# Patient Record
Sex: FEMALE | Race: WHITE | Employment: FULL TIME | URBAN - METROPOLITAN AREA
[De-identification: names, ages, dates, MRNs, and addresses within clinical notes are randomized per-mention and may not be internally consistent; named-entity substitution may affect disease eponyms.]

---

## 2017-09-07 NOTE — H&P
Vital Signs   24Years Old Female  Height:  62.5 inches  Weight: 140 pounds  BMI:      25.29  BSA:      1.65  BP:       92/54    Past Pregnancy History   : 0  Para:     0  Aborta:  0  Term: 0, Premature: 0, Living Children: 0, Vaginal Deliveries: 0, C-Sections: 0, Elect. Ab: 0, Ectopics: 0    Gynecologic History   Last Menstrual Period: 2017  Additional Menses Information: irregular  Does patient have any problems with urine leakage? no  Does patient have any other bladder problems? yes  History of abnormal pap: no  Gardasil Injection History: Complete  Pt currently sexually active: no  Pt ever sexually active: yes  Current Contraception: Oral Contraception. History of STD: yes   STD Type: Herpes. Visit Type:  Problem GYN  Primary Provider:  Jackie Ferris MD    CC:  dyspareunia. History of Present Illness:  22yo Patient presents today complaining of pain during intercourse and recurrent UTIs treated at [facility], not taking OCPs x2mo. Endometriosis diagnosed at laparoscopy in . Then did lupron and PT and has improved dramatically. Was on OCPs until a few months ago, when her pain gradually began to increase. Also has some \"growths\" on her perineum which are more painful with sex and she would like them removed as well. Currently taking keflex for UTI. Allergies    This patient has no known allergies. Medications Removed from Medication List          Past Medical History:     Reviewed history from 2014 and no changes required:        acne        h/o of MRSA        Endometriosis        sepsis 10/13-hospitalized    Past Surgical History:     Reviewed history from 11/15/2016 and no changes required:        Colonoscopy at age 2 c/o rectal bleeding        dx laparoscopy, excision of endometriosis.  13 Mitali    Family History Summary:      Reviewed history Last on 2017 and no changes required:2017  Mother Dannie Kayser.) - Has Family History of Breast Cancer - Entered On: 2015    General Comments - FH:  Family history transferred to 2 compliant       Social History:     Reviewed history from 2017 and no changes required:        Single                Graduated [employer], apply for grad [employer] for social work        has 1 older sister, 2 younger sisters        lives with 2 of her sisters        dad in insurance                Smoking History:        Patient has never smoked. Risk Factors:     Smoked Tobacco Use:  Never smoker  Smokeless Tobacco Use:  Never  Passive smoke exposure:  no  Drug use:  no  HIV high-risk behavior:  no  Caffeine use:  occas. drinks per day  Alcohol use:  yes     Type:  rare  Exercise:  yes     Times per week:  3-4     Type of Exercise:  gym  Seatbelt use:  100 %  Sun Exposure:  occasionally    Family History Risk Factors:     Family History of MI in females < 72years old:  no     Family History of MI in males < 54years old:  no    Previous Tobacco Use: Signed On - 2017  Smoked Tobacco Use:  Never smoker  Smokeless Tobacco Use:  Never  Passive smoke exposure:  no  Drug use:  no  HIV high-risk behavior:  no  Caffeine use:  occas. drinks per day    Previous Alcohol Use: Signed On - 2017  Alcohol use:  yes     Type:  rare  Exercise:  yes     Times per week:  3-4     Type of Exercise:  gym  Seatbelt use:  100 %  Sun Exposure:  occasionally    Family History Risk Factors:     Family History of MI in females < 72years old:  no     Family History of MI in males < 54years old:  no    PAP Smear History:     Date of Last PAP Smear:  10/19/2016      Past Pregnancy History      :  0     Term Births:  0     Premature Births: 0     Living Children: 0     Para:   0     Prev : 0     Aborta:  0     Elect. Ab:  0     Spont.  Ab:  0     Ectopics:  0      Review of Systems        See HPI      General Medical Physical Exam:     General Appearance:       well developed, well nourished, in no acute distress    Head: Inspection:  normocephalic without obvious abnormalities    Eyes:        External:  EOM intact    Ears, Nose, Throat:        External:  normocephalic and atraumatic       Hearing:  grossly intact    Neck:        Neck:  supple; no masses; trachea midline       Thyroid:  no nodules, masses, tenderness, or enlargement    Respiratory:        Resp. effort:  no use of accessory muscles       Auscultation:   no rales, rhonchi, or wheezes    Cardiovascular: Auscultation:   normal S1 and  S2; no murmur, rub, or gallop       Peripheral circ: no cyanosis, clubbing, or edema    Gastrointestinal:        Abdomen:  soft and non-tender with normal bowel sounds; no masses       Liver/spleen:   normal to percussion; no enlargement or nodularity       Hernia:  no hernias    Genitourinary:        Ext. genitalia: three discreete skin tags at perineum and a moderate hemorrhoid which is nontender noted. Urethra:  No palpable abnormalities. Mild hypermobility noted. Bladder:  no cystocele       Vagina:  normal appearing without lesions or discharge       Cervix:  normal appearance; no lesions or discharge       Uterus:  normal size and position; no masses       Adnexa:  diffusely tender on pelvic exam, marked tenderness and nodularity cul de sac and left side    Musculoskeletal:        Gait/station:  normal gait    Lymphatic:        Axilla:  no axillary adenopathy       Inguinal:  <1cm, mobile lymph nodes    Skin:        Inspection:  no rashes, suspicious lesions, or ulcerations    Neurological:        Other:  grossly intact    Psychiatric:       Orientation:  oriented to time, place, and person            Impression & Recommendations:    Problem # 1:  Endometriosis (ICD-617.0) (OIZ51-J31.0)  Now with marked recurrent cul de sac symptoms. Discussed repeating lupron vs. proceeding with another laparoscopy and she prefers the latter. Will plan scope and excision of endometriosis.    I reviewed with the patient indications, alternatives, risks, and benefits of proposed procedure, the risks of which include injury to bowel, bladder, nerves, blood vessels, or ureters, injury to any other intraabdominal structure, risk of bleeding and infection, and inherent risks of anesthesia, including death. The patient indicates understanding of these risks, and agrees to the proposed procedure. She is instructed to stay NPO after midnight the night before surgery. Orders:  Detailed Moderate Est level 4 ([id number])      Problem # 2:  Skin tag of the vulva and perineum (ICD-624.8) (GNB82-M34.89)  Would like these removed as well. Discussed removal of skin tag but NOT the hemorrhoid which is also present and she understands and agrees. Her hemorrhoid is asymptomatic. Orders:  Detailed Moderate Est level 4 ([id number])      Medications (at conclusion of this visit)    08/22/2017 LIDOCAINE HCL 2 % GEL (LIDOCAINE HCL) apply to AA as needed for discomfort  05/03/2016 LO LOESTRIN FE 1 MG-10 MCG / 10 MCG TABS (NORETHIN-ETH ESTRAD-FE BIPHAS) one daily. 04/14/2016 TRAMADOL HCL 50 MG TABS (TRAMADOL HCL) one po every 6 hours prn pain  01/07/2016 URIBEL 118 MG CAPS (METH-HYO-M BL-NA PHOS-PH SAL) Take 1 po q 6 hours prn burning, frequency, pain. Urine will turn blue  10/29/2015 MULTIVITAMINS ORAL CAPS (MULTIPLE VITAMIN) Prescribed by khoi Hernandez/Tana Love MD  10/29/2015 BUSPIRONE HCL TABS (BUSPIRONE HCL TABS) Prescribed by non 606/Tana Love MD  04/21/2014 VALACYCLOVIR HCL 1 GM TABS (VALACYCLOVIR HCL) one a day  10/18/2011 LEXAPRO TABS (ESCITALOPRAM OXALATE TABS) Prescribed by non 606/Tana Love MD          Electronically signed by Jerel Triana MD on 09/07/2017 at 11:03 AM    ________________________________________________________________________    The History and Physical is reviewed today. The patient is seen and examined and no changes are required.   Jerel Triana MD  9/11/2017  10:26 AM

## 2017-09-08 NOTE — PERIOP NOTES
[facility]  Preoperative Instructions        Surgery Date 9/11/17           Time of Arrival 8:30am    1. On the day of your surgery, please report to the Surgical Services Registration Desk and sign in at your designated time. The Surgery Center is located to the right of the Emergency Room. 2. You must have someone with you to drive you home. You should not drive a car for 24 hours following surgery. Please make arrangements for a friend or family member to stay with you for the first 24 hours after your surgery. 3. Do not have anything to eat or drink (including water, gum, mints, coffee, juice) after midnight 9/10/17?? Imani Belch ? This may not apply to medications prescribed by your physician. ?(Please note below the special instructions with medications to take the morning of your procedure.)    4. We recommend you do not drink any alcoholic beverages for 24 hours before and after your surgery. 5. Stop all Aspirin, non-steroidal anti-inflammatory drugs (i.e. Advil, Aleve), vitamins, and supplements?as directed by your surgeon's office. ? **If you are currently taking Plavix, Coumadin, or other blood-thinning agents, contact your surgeon for instructions. **    6. Wear comfortable clothes. Wear glasses instead of contacts. Do not bring any money or jewelry. Please bring picture ID, insurance card, and any prearranged co-payment or hospital payment. Do not wear make-up, particularly mascara the morning of your surgery. Do not wear nail polish, particularly if you are having foot /hand surgery. Wear your hair loose or down, no ponytails, buns, mahsa pins or clips. All body piercings must be removed. Please shower with antibacterial soap for three consecutive days before and on the morning of surgery, but do not apply any lotions, powders or deodorants after the shower on the day of surgery. Please use a fresh towels after each shower.  Please sleep in clean clothes and change bed linens the night before surgery. Please do not shave for 48 hours prior to surgery. Shaving of the face is acceptable. 7. You should understand that if you do not follow these instructions your surgery may be cancelled. If your physical condition changes (I.e. fever, cold or flu) please contact your surgeon as soon as possible. 8. It is important that you be on time. If a situation occurs where you may be late, please call [phone] (OR Holding Area). 9. If you have any questions and or problems, please call [phone] (Pre-admission Testing). 10. Your surgery time may be subject to change. You will receive a phone call the evening prior if your time changes. 11.  If having outpatient surgery, you must have someone to drive you here, stay with you during the duration of your stay, and to drive you home at time of discharge. 12.   In an effort to improve the efficiency, privacy, and safety for all of our Pre-op patients visitors are not allowed in the Holding area. Once you arrive and are registered your family/visitors will be asked to remain in the waiting room. The Pre-op staff will get you from the Surgical Waiting Area and will explain to you and your family/visitors that the Pre-op phase is beginning. The staff will answer any questions and provide instructions for tracking of the patient, by use of the existing tracking number and color-coded status board in the waiting room. At this time the staff will also ask for your designated spokesperson information in the event that the physician or staff need to provide an update or obtain any pertinent information. The designated spokesperson will be notified if the physician needs to speak to family during the pre-operative phase. If at any time your family/visitors has questions or concerns they may approach the volunteer desk in the waiting area for assistance.          Special Instructions:    MEDICATIONS TO TAKE THE MORNING OF SURGERY WITH A SIP OF WATER:Buspar, Lexapro      I understand a pre-operative phone call will be made to verify my surgery time. In the event that I am not available, I give permission for a message to be left on my answering service and/or with another person?   Yes          ___________________      __________   _________    (Signature of Patient)             (Witness)                (Date and Time)

## 2017-09-11 ENCOUNTER — HOSPITAL ENCOUNTER (OUTPATIENT)
Age: 22
Setting detail: OUTPATIENT SURGERY
Discharge: HOME | End: 2017-09-11
Attending: OBSTETRICS & GYNECOLOGY | Admitting: OBSTETRICS & GYNECOLOGY
Payer: COMMERCIAL

## 2017-09-11 ENCOUNTER — ANESTHESIA (OUTPATIENT)
Dept: SURGERY | Age: 22
End: 2017-09-11
Payer: COMMERCIAL

## 2017-09-11 ENCOUNTER — ANESTHESIA EVENT (OUTPATIENT)
Dept: SURGERY | Age: 22
End: 2017-09-11
Payer: COMMERCIAL

## 2017-09-11 VITALS
OXYGEN SATURATION: 95 % | RESPIRATION RATE: 14 BRPM | BODY MASS INDEX: 24.69 KG/M2 | HEART RATE: 66 BPM | HEIGHT: 63 IN | DIASTOLIC BLOOD PRESSURE: 56 MMHG | SYSTOLIC BLOOD PRESSURE: 105 MMHG | WEIGHT: 139.33 LBS | TEMPERATURE: 98.9 F

## 2017-09-11 LAB — HCG UR QL: NEGATIVE

## 2017-09-11 PROCEDURE — 77030008684 HC TU ET CUF COVD -B: Performed by: ANESTHESIOLOGY

## 2017-09-11 PROCEDURE — 76210000006 HC OR PH I REC 0.5 TO 1 HR: Performed by: OBSTETRICS & GYNECOLOGY

## 2017-09-11 PROCEDURE — 74011250636 HC RX REV CODE- 250/636: Performed by: ANESTHESIOLOGY

## 2017-09-11 PROCEDURE — C1765 ADHESION BARRIER: HCPCS | Performed by: OBSTETRICS & GYNECOLOGY

## 2017-09-11 PROCEDURE — 77030026438 HC STYL ET INTUB CARD -A: Performed by: ANESTHESIOLOGY

## 2017-09-11 PROCEDURE — 74011000250 HC RX REV CODE- 250: Performed by: OBSTETRICS & GYNECOLOGY

## 2017-09-11 PROCEDURE — 76210000020 HC REC RM PH II FIRST 0.5 HR: Performed by: OBSTETRICS & GYNECOLOGY

## 2017-09-11 PROCEDURE — 77030008771 HC TU NG SALEM SUMP -A: Performed by: ANESTHESIOLOGY

## 2017-09-11 PROCEDURE — 77030002933 HC SUT MCRYL J&J -A: Performed by: OBSTETRICS & GYNECOLOGY

## 2017-09-11 PROCEDURE — 77030018684: Performed by: OBSTETRICS & GYNECOLOGY

## 2017-09-11 PROCEDURE — 81025 URINE PREGNANCY TEST: CPT

## 2017-09-11 PROCEDURE — 77030019908 HC STETH ESOPH SIMS -A: Performed by: ANESTHESIOLOGY

## 2017-09-11 PROCEDURE — 74011250636 HC RX REV CODE- 250/636

## 2017-09-11 PROCEDURE — 74011250637 HC RX REV CODE- 250/637

## 2017-09-11 PROCEDURE — 77030020263 HC SOL INJ SOD CL0.9% LFCR 1000ML: Performed by: OBSTETRICS & GYNECOLOGY

## 2017-09-11 PROCEDURE — 77030034850: Performed by: OBSTETRICS & GYNECOLOGY

## 2017-09-11 PROCEDURE — 76010000153 HC OR TIME 1.5 TO 2 HR: Performed by: OBSTETRICS & GYNECOLOGY

## 2017-09-11 PROCEDURE — 74011000250 HC RX REV CODE- 250

## 2017-09-11 PROCEDURE — 77030020782 HC GWN BAIR PAWS FLX 3M -B

## 2017-09-11 PROCEDURE — 77030011640 HC PAD GRND REM COVD -A: Performed by: OBSTETRICS & GYNECOLOGY

## 2017-09-11 PROCEDURE — 77030020702 HC ADAPT HARM DISP J&J -B: Performed by: OBSTETRICS & GYNECOLOGY

## 2017-09-11 PROCEDURE — 88305 TISSUE EXAM BY PATHOLOGIST: CPT | Performed by: OBSTETRICS & GYNECOLOGY

## 2017-09-11 PROCEDURE — 77030016151 HC PROTCTR LNS DFOG COVD -B: Performed by: OBSTETRICS & GYNECOLOGY

## 2017-09-11 PROCEDURE — 77030018836 HC SOL IRR NACL ICUM -A: Performed by: OBSTETRICS & GYNECOLOGY

## 2017-09-11 PROCEDURE — 88304 TISSUE EXAM BY PATHOLOGIST: CPT | Performed by: OBSTETRICS & GYNECOLOGY

## 2017-09-11 PROCEDURE — 77030033067 HC SUT PDO STRATFX SPIR J&J -B: Performed by: OBSTETRICS & GYNECOLOGY

## 2017-09-11 PROCEDURE — 77030010507 HC ADH SKN DERMBND J&J -B: Performed by: OBSTETRICS & GYNECOLOGY

## 2017-09-11 PROCEDURE — 77030008756 HC TU IRR SUC STRY -B: Performed by: OBSTETRICS & GYNECOLOGY

## 2017-09-11 PROCEDURE — 77030032490 HC SLV COMPR SCD KNE COVD -B: Performed by: OBSTETRICS & GYNECOLOGY

## 2017-09-11 PROCEDURE — 77030010032 HC SCLPL DISECT HARM J&J -C: Performed by: OBSTETRICS & GYNECOLOGY

## 2017-09-11 PROCEDURE — 77030018778 HC MANIP UTER VCAR CNMD -B: Performed by: OBSTETRICS & GYNECOLOGY

## 2017-09-11 PROCEDURE — 77030008517 HC TBNG INSUF ENDO STOR -B: Performed by: OBSTETRICS & GYNECOLOGY

## 2017-09-11 PROCEDURE — 77030008606 HC TRCR ENDOSC KII AMR -B: Performed by: OBSTETRICS & GYNECOLOGY

## 2017-09-11 PROCEDURE — 77030034154 HC SHR COAG HARM ACE J&J -F: Performed by: OBSTETRICS & GYNECOLOGY

## 2017-09-11 PROCEDURE — 76060000034 HC ANESTHESIA 1.5 TO 2 HR: Performed by: OBSTETRICS & GYNECOLOGY

## 2017-09-11 PROCEDURE — 77030002888 HC SUT CHRMC J&J -A: Performed by: OBSTETRICS & GYNECOLOGY

## 2017-09-11 RX ORDER — FENTANYL CITRATE 50 UG/ML
50 INJECTION, SOLUTION INTRAMUSCULAR; INTRAVENOUS AS NEEDED
Status: DISCONTINUED | OUTPATIENT
Start: 2017-09-11 | End: 2017-09-11 | Stop reason: HOSPADM

## 2017-09-11 RX ORDER — SODIUM CHLORIDE, SODIUM LACTATE, POTASSIUM CHLORIDE, CALCIUM CHLORIDE 600; 310; 30; 20 MG/100ML; MG/100ML; MG/100ML; MG/100ML
100 INJECTION, SOLUTION INTRAVENOUS CONTINUOUS
Status: DISCONTINUED | OUTPATIENT
Start: 2017-09-11 | End: 2017-09-11 | Stop reason: HOSPADM

## 2017-09-11 RX ORDER — SODIUM CHLORIDE, SODIUM LACTATE, POTASSIUM CHLORIDE, CALCIUM CHLORIDE 600; 310; 30; 20 MG/100ML; MG/100ML; MG/100ML; MG/100ML
25 INJECTION, SOLUTION INTRAVENOUS CONTINUOUS
Status: DISCONTINUED | OUTPATIENT
Start: 2017-09-11 | End: 2017-09-11 | Stop reason: HOSPADM

## 2017-09-11 RX ORDER — FENTANYL CITRATE 50 UG/ML
25 INJECTION, SOLUTION INTRAMUSCULAR; INTRAVENOUS
Status: DISCONTINUED | OUTPATIENT
Start: 2017-09-11 | End: 2017-09-11 | Stop reason: HOSPADM

## 2017-09-11 RX ORDER — MIDAZOLAM HYDROCHLORIDE 1 MG/ML
0.5 INJECTION, SOLUTION INTRAMUSCULAR; INTRAVENOUS
Status: DISCONTINUED | OUTPATIENT
Start: 2017-09-11 | End: 2017-09-11 | Stop reason: HOSPADM

## 2017-09-11 RX ORDER — DEXAMETHASONE SODIUM PHOSPHATE 100 MG/10ML
INJECTION INTRAMUSCULAR; INTRAVENOUS AS NEEDED
Status: DISCONTINUED | OUTPATIENT
Start: 2017-09-11 | End: 2017-09-11 | Stop reason: HOSPADM

## 2017-09-11 RX ORDER — BUPIVACAINE HYDROCHLORIDE AND EPINEPHRINE 5; 5 MG/ML; UG/ML
INJECTION, SOLUTION EPIDURAL; INTRACAUDAL; PERINEURAL AS NEEDED
Status: DISCONTINUED | OUTPATIENT
Start: 2017-09-11 | End: 2017-09-11 | Stop reason: HOSPADM

## 2017-09-11 RX ORDER — HYDROMORPHONE HYDROCHLORIDE 2 MG/ML
INJECTION, SOLUTION INTRAMUSCULAR; INTRAVENOUS; SUBCUTANEOUS AS NEEDED
Status: DISCONTINUED | OUTPATIENT
Start: 2017-09-11 | End: 2017-09-11 | Stop reason: HOSPADM

## 2017-09-11 RX ORDER — HYDROCODONE BITARTRATE AND ACETAMINOPHEN 5; 325 MG/1; MG/1
1 TABLET ORAL AS NEEDED
Status: DISCONTINUED | OUTPATIENT
Start: 2017-09-11 | End: 2017-09-11 | Stop reason: HOSPADM

## 2017-09-11 RX ORDER — OXYCODONE AND ACETAMINOPHEN 5; 325 MG/1; MG/1
1 TABLET ORAL ONCE
Status: COMPLETED | OUTPATIENT
Start: 2017-09-11 | End: 2017-09-11

## 2017-09-11 RX ORDER — SUCCINYLCHOLINE CHLORIDE 20 MG/ML
INJECTION INTRAMUSCULAR; INTRAVENOUS AS NEEDED
Status: DISCONTINUED | OUTPATIENT
Start: 2017-09-11 | End: 2017-09-11 | Stop reason: HOSPADM

## 2017-09-11 RX ORDER — GLYCOPYRROLATE 0.2 MG/ML
INJECTION INTRAMUSCULAR; INTRAVENOUS AS NEEDED
Status: DISCONTINUED | OUTPATIENT
Start: 2017-09-11 | End: 2017-09-11 | Stop reason: HOSPADM

## 2017-09-11 RX ORDER — LIDOCAINE HYDROCHLORIDE 20 MG/ML
INJECTION, SOLUTION EPIDURAL; INFILTRATION; INTRACAUDAL; PERINEURAL AS NEEDED
Status: DISCONTINUED | OUTPATIENT
Start: 2017-09-11 | End: 2017-09-11 | Stop reason: HOSPADM

## 2017-09-11 RX ORDER — MIDAZOLAM HYDROCHLORIDE 1 MG/ML
1 INJECTION, SOLUTION INTRAMUSCULAR; INTRAVENOUS AS NEEDED
Status: DISCONTINUED | OUTPATIENT
Start: 2017-09-11 | End: 2017-09-11 | Stop reason: HOSPADM

## 2017-09-11 RX ORDER — ONDANSETRON 2 MG/ML
INJECTION INTRAMUSCULAR; INTRAVENOUS AS NEEDED
Status: DISCONTINUED | OUTPATIENT
Start: 2017-09-11 | End: 2017-09-11 | Stop reason: HOSPADM

## 2017-09-11 RX ORDER — HYDROMORPHONE HYDROCHLORIDE 1 MG/ML
0.5 INJECTION, SOLUTION INTRAMUSCULAR; INTRAVENOUS; SUBCUTANEOUS
Status: DISCONTINUED | OUTPATIENT
Start: 2017-09-11 | End: 2017-09-11 | Stop reason: HOSPADM

## 2017-09-11 RX ORDER — NEOSTIGMINE METHYLSULFATE 1 MG/ML
INJECTION INTRAVENOUS AS NEEDED
Status: DISCONTINUED | OUTPATIENT
Start: 2017-09-11 | End: 2017-09-11 | Stop reason: HOSPADM

## 2017-09-11 RX ORDER — IBUPROFEN 800 MG/1
800 TABLET ORAL
Qty: 30 TAB | Refills: 1 | Status: SHIPPED | OUTPATIENT
Start: 2017-09-11 | End: 2020-09-09 | Stop reason: SDUPTHER

## 2017-09-11 RX ORDER — DIPHENHYDRAMINE HYDROCHLORIDE 50 MG/ML
12.5 INJECTION, SOLUTION INTRAMUSCULAR; INTRAVENOUS AS NEEDED
Status: DISCONTINUED | OUTPATIENT
Start: 2017-09-11 | End: 2017-09-11 | Stop reason: HOSPADM

## 2017-09-11 RX ORDER — OXYCODONE AND ACETAMINOPHEN 5; 325 MG/1; MG/1
TABLET ORAL
Status: COMPLETED
Start: 2017-09-11 | End: 2017-09-11

## 2017-09-11 RX ORDER — FENTANYL CITRATE 50 UG/ML
INJECTION, SOLUTION INTRAMUSCULAR; INTRAVENOUS AS NEEDED
Status: DISCONTINUED | OUTPATIENT
Start: 2017-09-11 | End: 2017-09-11 | Stop reason: HOSPADM

## 2017-09-11 RX ORDER — KETOROLAC TROMETHAMINE 30 MG/ML
INJECTION, SOLUTION INTRAMUSCULAR; INTRAVENOUS AS NEEDED
Status: DISCONTINUED | OUTPATIENT
Start: 2017-09-11 | End: 2017-09-11 | Stop reason: HOSPADM

## 2017-09-11 RX ORDER — LIDOCAINE HYDROCHLORIDE 10 MG/ML
0.1 INJECTION, SOLUTION EPIDURAL; INFILTRATION; INTRACAUDAL; PERINEURAL AS NEEDED
Status: DISCONTINUED | OUTPATIENT
Start: 2017-09-11 | End: 2017-09-11 | Stop reason: HOSPADM

## 2017-09-11 RX ORDER — ONDANSETRON 2 MG/ML
4 INJECTION INTRAMUSCULAR; INTRAVENOUS AS NEEDED
Status: DISCONTINUED | OUTPATIENT
Start: 2017-09-11 | End: 2017-09-11 | Stop reason: HOSPADM

## 2017-09-11 RX ORDER — MIDAZOLAM HYDROCHLORIDE 1 MG/ML
INJECTION, SOLUTION INTRAMUSCULAR; INTRAVENOUS AS NEEDED
Status: DISCONTINUED | OUTPATIENT
Start: 2017-09-11 | End: 2017-09-11 | Stop reason: HOSPADM

## 2017-09-11 RX ORDER — ROCURONIUM BROMIDE 10 MG/ML
INJECTION, SOLUTION INTRAVENOUS AS NEEDED
Status: DISCONTINUED | OUTPATIENT
Start: 2017-09-11 | End: 2017-09-11 | Stop reason: HOSPADM

## 2017-09-11 RX ORDER — PROPOFOL 10 MG/ML
INJECTION, EMULSION INTRAVENOUS AS NEEDED
Status: DISCONTINUED | OUTPATIENT
Start: 2017-09-11 | End: 2017-09-11 | Stop reason: HOSPADM

## 2017-09-11 RX ORDER — OXYCODONE AND ACETAMINOPHEN 5; 325 MG/1; MG/1
2 TABLET ORAL
Qty: 30 TAB | Refills: 0 | Status: SHIPPED | OUTPATIENT
Start: 2017-09-11 | End: 2020-09-09

## 2017-09-11 RX ADMIN — FENTANYL CITRATE 25 MCG: 50 INJECTION, SOLUTION INTRAMUSCULAR; INTRAVENOUS at 12:39

## 2017-09-11 RX ADMIN — MIDAZOLAM HYDROCHLORIDE 2 MG: 1 INJECTION, SOLUTION INTRAMUSCULAR; INTRAVENOUS at 10:30

## 2017-09-11 RX ADMIN — ONDANSETRON 4 MG: 2 INJECTION INTRAMUSCULAR; INTRAVENOUS at 11:49

## 2017-09-11 RX ADMIN — SODIUM CHLORIDE, POTASSIUM CHLORIDE, SODIUM LACTATE AND CALCIUM CHLORIDE: 600; 310; 30; 20 INJECTION, SOLUTION INTRAVENOUS at 10:30

## 2017-09-11 RX ADMIN — GLYCOPYRROLATE 0.5 MG: 0.2 INJECTION INTRAMUSCULAR; INTRAVENOUS at 11:47

## 2017-09-11 RX ADMIN — ROCURONIUM BROMIDE 20 MG: 10 INJECTION, SOLUTION INTRAVENOUS at 11:12

## 2017-09-11 RX ADMIN — HYDROMORPHONE HYDROCHLORIDE 0.2 MG: 2 INJECTION, SOLUTION INTRAMUSCULAR; INTRAVENOUS; SUBCUTANEOUS at 10:55

## 2017-09-11 RX ADMIN — NEOSTIGMINE METHYLSULFATE 3 MG: 1 INJECTION INTRAVENOUS at 11:47

## 2017-09-11 RX ADMIN — FENTANYL CITRATE 100 MCG: 50 INJECTION, SOLUTION INTRAMUSCULAR; INTRAVENOUS at 10:40

## 2017-09-11 RX ADMIN — LIDOCAINE HYDROCHLORIDE 100 MG: 20 INJECTION, SOLUTION EPIDURAL; INFILTRATION; INTRACAUDAL; PERINEURAL at 10:40

## 2017-09-11 RX ADMIN — ROCURONIUM BROMIDE 10 MG: 10 INJECTION, SOLUTION INTRAVENOUS at 10:40

## 2017-09-11 RX ADMIN — FENTANYL CITRATE 25 MCG: 50 INJECTION, SOLUTION INTRAMUSCULAR; INTRAVENOUS at 12:52

## 2017-09-11 RX ADMIN — SUCCINYLCHOLINE CHLORIDE 100 MG: 20 INJECTION INTRAMUSCULAR; INTRAVENOUS at 10:42

## 2017-09-11 RX ADMIN — OXYCODONE AND ACETAMINOPHEN 1 TABLET: 5; 325 TABLET ORAL at 13:13

## 2017-09-11 RX ADMIN — OXYCODONE HYDROCHLORIDE AND ACETAMINOPHEN 1 TABLET: 5; 325 TABLET ORAL at 13:13

## 2017-09-11 RX ADMIN — DEXAMETHASONE SODIUM PHOSPHATE 10 MG: 100 INJECTION INTRAMUSCULAR; INTRAVENOUS at 10:48

## 2017-09-11 RX ADMIN — PROPOFOL 300 MG: 10 INJECTION, EMULSION INTRAVENOUS at 10:41

## 2017-09-11 RX ADMIN — KETOROLAC TROMETHAMINE 30 MG: 30 INJECTION, SOLUTION INTRAMUSCULAR; INTRAVENOUS at 12:02

## 2017-09-11 NOTE — DISCHARGE INSTRUCTIONS
After Care Instructions For Your Laparoscopy      1. You may resume your usual diet once the nausea resolves. Initially, try sips of warm fluids and a bland diet. 2. Avoid heavy lifting or straining. Gradually increase your activity. First try walking and doing light activity around the house. You may resume your normal habits if no significant discomfort or bleeding develops. Most women can return to work within 2-7 days after this procedure. 3. Sexual intercourse can be resumed as soon as desired provided the discomfort following surgery has subsided. 4. You may take showers or tub baths. It is also safe to swim or use hot tubs once you feel up to it. 5. Some lower abdominal cramping typically occurs after this procedure. Tylenol, Motrin or your prescribed pain medication should relieve this discomfort. You should notice steady improvement in the pain over the next day or so. It is also not unusual to experience some discomfort under your ribs or to notice neck or shoulder soreness. This is due to gas used during the surgery to help the physicians see your pelvic organs. The gas is reabsorbed over a 24 hour course. 6. It is not unusual to have vaginal spotting lasting 2-3 days. It is difficult to predict when your next menstrual period will occur as your body has undergone a major stress. Your period should regulate itself within the first 6 weeks post-op. 7. A bruise may appear around the incision and will gradually resolve as it heals. 8. The suture used to close the incision may be visible above the skin. If so, it will be removed at your office visit. However, frequently sutures are placed below the skin and will reabsorb on their own.   There will be no need for removal.     9. Call the office at [phone] to report any of the following problems: Abdominal pain that is increasing in severity, heavy vaginal bleeding, drainage or separation of your incision site, temperature greater than 100.4 or persistent nausea and vomiting. 10. You should be seen in the office following your surgery. Call for an appointment if this has not already been arranged. At this appointment, the findings noted at the time of your procedure will be discussed. 11. You may remove the dressing from your incision after 12 hours. Ibuprofen (Advil, Advil Children's, Motrin, Children's Ibuprofen) - (By mouth)   Why this medicine is used:   Treats pain and fever. This medicine is an NSAID. Contact a nurse or doctor right away if you have:  · Change in how much or how often you urinate  · Severe stomach pain, vomiting blood, bloody or black tarry stools  · Swelling in your hands, ankles, or feet; rapid weight gain     Common side effects:  · Constipation, diarrhea, gas, mild upset stomach  · Ringing in your ears, dizziness, headache  © 2017 [address] is for End User's use only and may not be sold, redistributed or otherwise used for commercial purposes. Narcotic-Analgesic/Acetaminophen (Percocet, Norco, Lorcet HD, Lortab 10/325) - (By mouth)   Why this medicine is used:   Relieves pain. Contact a nurse or doctor right away if you have:  · Extreme weakness, shallow breathing, slow heartbeat  · Severe confusion, lightheadedness, dizziness, fainting  · Yellow skin or eyes, dark urine or pale stools  · Severe constipation, severe stomach pain, nausea, vomiting, loss of appetite  · Sweating or cold, clammy skin     Common side effects:  · Mild constipation, nausea, vomiting  · Sleepiness, tiredness  · Itching, rash  © 2017 [facility] Information is for End User's use only and may not be sold, redistributed or otherwise used for commercial purposes.       DISCHARGE SUMMARY from Nurse    The following personal items are in your possession at time of discharge:    Dental Appliances: None  Visual Aid: None  Hearing Aids/Status: Does not own PATIENT INSTRUCTIONS:    After general anesthesia or intravenous sedation, for 24 hours or while taking prescription Narcotics:  · Limit your activities  · Do not drive and operate hazardous machinery  · Do not make important personal or business decisions  · Do  not drink alcoholic beverages  · If you have not urinated within 8 hours after discharge, please contact your surgeon on call. Report the following to your surgeon:  · Excessive pain, swelling, redness or odor of or around the surgical area  · Temperature over 100.5  · Nausea and vomiting lasting longer than 4 hours or if unable to take medications  · Any signs of decreased circulation or nerve impairment to extremity: change in color, persistent  numbness, tingling, coldness or increase pain  · Any questions        *  Please give a list of your current medications to your Primary Care Provider. *  Please update this list whenever your medications are discontinued, doses are      changed, or new medications (including over-the-counter products) are added. *  Please carry medication information at all times in case of emergency situations. These are general instructions for a healthy lifestyle:    No smoking/ No tobacco products/ Avoid exposure to second hand smoke    Surgeon General's Warning:  Quitting smoking now greatly reduces serious risk to your health. Obesity, smoking, and sedentary lifestyle greatly increases your risk for illness    A healthy diet, regular physical exercise & weight monitoring are important for maintaining a healthy lifestyle    You may be retaining fluid if you have a history of heart failure or if you experience any of the following symptoms:  Weight gain of 3 pounds or more overnight or 5 pounds in a week, increased swelling in our hands or feet or shortness of breath while lying flat in bed.   Please call your doctor as soon as you notice any of these symptoms; do not wait until your next office visit. Recognize signs and symptoms of STROKE:    F-face looks uneven    A-arms unable to move or move unevenly    S-speech slurred or non-existent    T-time-call 911 as soon as signs and symptoms begin-DO NOT go       Back to bed or wait to see if you get better-TIME IS BRAIN. Warning Signs of HEART ATTACK     Call 911 if you have these symptoms:   Chest discomfort. Most heart attacks involve discomfort in the center of the chest that lasts more than a few minutes, or that goes away and comes back. It can feel like uncomfortable pressure, squeezing, fullness, or pain.  Discomfort in other areas of the upper body. Symptoms can include pain or discomfort in one or both arms, the back, neck, jaw, or stomach.  Shortness of breath with or without chest discomfort.  Other signs may include breaking out in a cold sweat, nausea, or lightheadedness. Don't wait more than five minutes to call 911 - MINUTES MATTER! Fast action can save your life. Calling 911 is almost always the fastest way to get lifesaving treatment. Emergency Medical Services staff can begin treatment when they arrive -- up to an hour sooner than if someone gets to the hospital by car. The discharge information has been reviewed with the patient and parent. The patient and parent verbalized understanding. Discharge medications reviewed with the patient and mother and appropriate educational materials and side effects teaching were provided.

## 2017-09-11 NOTE — PERIOP NOTES
Handoff Report from Operating Room to PACU    [facility] from [facility]yanne Logan RN and [facility] Tricia Fish. Surgeon(s):  Brigid Horne MD  And Procedure(s) (LRB):  LAPAROSCOPIC EXCISION OF ENDOMETRIOSIS AND EXCISION OF PERINEUM SKIN TAGS (N/A)  confirmed   with allergies and dressings discussed. Anesthesia type, drugs, patient history, complications, estimated blood loss, vital signs, intake and output, and last pain medication, lines, reversal medications and temperature were reviewed.

## 2017-09-11 NOTE — ANESTHESIA PREPROCEDURE EVALUATION
Anesthetic History   No history of anesthetic complications            Review of Systems / Medical History  Patient summary reviewed, nursing notes reviewed and pertinent labs reviewed    Pulmonary  Within defined limits                 Neuro/Psych   Within defined limits      Psychiatric history     Cardiovascular  Within defined limits                Exercise tolerance: >4 METS     GI/Hepatic/Renal  Within defined limits              Endo/Other  Within defined limits           Other Findings              Physical Exam    Airway  Mallampati: II  TM Distance: 4 - 6 cm  Neck ROM: normal range of motion   Mouth opening: Normal     Cardiovascular  Regular rate and rhythm,  S1 and S2 normal,  no murmur, click, rub, or gallop             Dental  No notable dental hx       Pulmonary  Breath sounds clear to auscultation               Abdominal  GI exam deferred       Other Findings            Anesthetic Plan    ASA: 2  Anesthesia type: general    Monitoring Plan: BIS      Induction: Intravenous  Anesthetic plan and risks discussed with: Patient

## 2017-09-11 NOTE — ANESTHESIA POSTPROCEDURE EVALUATION
Post-Anesthesia Evaluation and Assessment    Patient: Chris Gates MRN: [id number]  SSN: [id number]    YOB: 1995  Age: 24 y.o. Sex: female       Cardiovascular Function/Vital Signs  Visit Vitals    /59    Pulse 83    Temp 36.8 °C (98.2 °F)    Resp 14    Ht 5' 3\" (1.6 m)    Wt 63.2 kg (139 lb 5.3 oz)    SpO2 94%    BMI 24.68 kg/m2       Patient is status post general anesthesia for Procedure(s):  LAPAROSCOPIC EXCISION OF ENDOMETRIOSIS AND EXCISION OF PERINEUM SKIN TAGS. Nausea/Vomiting: None    Postoperative hydration reviewed and adequate. Pain:  Pain Scale 1: Numeric (0 - 10) (09/11/17 1216)  Pain Intensity 1: 0 (09/11/17 1216)   Managed    Neurological Status:   Neuro (WDL): Exceptions to WDL (09/11/17 1216)  Neuro  Neurologic State: Drowsy; Eyes open to voice (09/11/17 1216)  Orientation Level: Oriented to person;Oriented to place;Oriented to situation (09/11/17 1216)  Cognition: Follows commands (09/11/17 1216)  Speech: Clear (09/11/17 1216)  LUE Motor Response: Purposeful;Spontaneous  (09/11/17 1216)  LLE Motor Response: Purposeful;Spontaneous  (09/11/17 1216)  RUE Motor Response: Purposeful;Spontaneous  (09/11/17 1216)  RLE Motor Response: Purposeful;Spontaneous  (09/11/17 1216)   At baseline    Mental Status and Level of Consciousness: Arousable    Pulmonary Status:   O2 Device: Nasal cannula (09/11/17 1218)   Adequate oxygenation and airway patent    Complications related to anesthesia: None    Post-anesthesia assessment completed.  No concerns    Signed By: Rahel Valera MD     September 11, 2017

## 2017-09-11 NOTE — IP AVS SNAPSHOT
[address] [facility] 
[phone] Patient: Mickie Quan MRN: [id number] :1995 Current Discharge Medication List  
  
START taking these medications Dose & Instructions Dispensing Information Comments Morning Noon Evening Bedtime  
 ibuprofen 800 mg tablet Commonly known as:  MOTRIN Your last dose was: Your next dose is:    
   
   
 Dose:  800 mg Take 1 Tab by mouth every eight (8) hours as needed for Pain. Quantity:  30 Tab Refills:  1  
     
   
   
   
  
 oxyCODONE-acetaminophen 5-325 mg per tablet Commonly known as:  PERCOCET Your last dose was: Your next dose is:    
   
   
 Dose:  2 Tab Take 2 Tabs by mouth every six (6) hours as needed for Pain. Max Daily Amount: 8 Tabs. Quantity:  30 Tab Refills:  0 CONTINUE these medications which have NOT CHANGED Dose & Instructions Dispensing Information Comments Morning Noon Evening Bedtime  
 busPIRone 7.5 mg tablet Commonly known as:  BUSPAR Your last dose was: Your next dose is:    
   
   
 Dose:  15 mg Take 15 mg by mouth two (2) times a day. Indications: GENERALIZED ANXIETY DISORDER Refills:  0  
     
   
   
   
  
 escitalopram oxalate 10 mg tablet Commonly known as:  Joe Juarez Your last dose was: Your next dose is:    
   
   
 Dose:  30 mg Take 30 mg by mouth daily. Refills:  0 LO LOESTRIN FE 1 mg-10 mcg (24)/10 mcg (2) Tab Generic drug:  norethindrone-e.estradiol-iron Your last dose was: Your next dose is:    
   
   
 TK 1 T PO QD Refills:  10  
     
   
   
   
  
 oxybutynin chloride XL 15 mg CR tablet Commonly known as:  DITROPAN XL Your last dose was: Your next dose is:    
   
   
 Dose:  15 mg Take 15 mg by mouth daily. Refills:  0 YOGIIBEL 118-10-40.8-36 mg Cap capsule Generic drug:  Mth-Me Blue-Sod Phos-PhSal-Hyo Your last dose was: Your next dose is:    
   
   
  Refills:  3  
     
   
   
   
  
 valACYclovir 1 gram tablet Commonly known as:  VALTREX Your last dose was: Your next dose is:    
   
   
 Dose:  1000 mg Take 1,000 mg by mouth nightly. Refills:  11 VESIcare 10 mg tablet Generic drug:  solifenacin Your last dose was: Your next dose is:    
   
   
 Dose:  10 mg Take 10 mg by mouth daily. Refills:  0 STOP taking these medications TRI-PREVIFEM (28) 0.18/0.215/0.25 mg-35 mcg (28) Tab Generic drug:  norgestimate-ethinyl estradiol Where to Get Your Medications Information on where to get these meds will be given to you by the nurse or doctor. ! Ask your nurse or doctor about these medications  
  ibuprofen 800 mg tablet  
 oxyCODONE-acetaminophen 5-325 mg per tablet

## 2017-09-11 NOTE — PERIOP NOTES
Report given to [facility] loo   AWARE THAT NO ANTIBIOTIC IS ORDERED SHE WILL CHECK WITH ME.  INFORMED THAT PHONE NEEDS TO GO TO MOTHER

## 2017-09-11 NOTE — OP NOTES
[address]   OP NOTE       Name:  Mirella Mixon   MR#:  [id number]   :  1995   Account #:  [de-identified]    Surgery Date:  2017   Date of Adm:  2017       PREOPERATIVE DIAGNOSES   1. Endometriosis. 2. Skin tag of the perineum. POSTOPERATIVE DIAGNOSES   1. Endometriosis. 2. Skin tag of the perineum. PROCEDURES PERFORMED: Laparoscopic excision of   endometriosis and excision of perineal skin tags. SURGEON: Jessica Montelongo MD    ASSISTANT: Lion Kong     ANESTHESIA: General.    ESTIMATED BLOOD LOSS: 25 mL. SPECIMENS REMOVED: Peritoneal excisions are submitted from the   left cul-de-sac, the left sidewall, the left ovarian fossa, the right   sidewall, the right cul-de-sac and also perianal skin tags were   submitted. OPERATIVE FINDINGS: There is endometriosis noted in small   patches across the breadth of the posterior pelvis and left ovarian   fossa, left and right sidewalls and left and right cul-de-sac peritoneum. Uterus and ovaries appeared normal. The upper abdominal anatomy   was also normal. The patient has 4 small skin tags noted on the   perineal skin closer to the anal verge than to the vagina. She also has   what appears to be an inflamed hemorrhoid which is separate from the   noted skin tags. COMPLICATIONS: None. DESCRIPTION OF PROCEDURE IN DETAIL: After informed consent   was obtained, the patient understands the risks, benefits and   alternatives of the proposed procedure. She was taken to the operating   room. General anesthesia was induced and she is prepped and draped   in the usual sterile fashion in the dorsal lithotomy position with the   arms tucked. A side-arm speculum was inserted and the Xtraicelka uterine   manipulator was placed without difficulty. The speculum was removed. The Rod catheter is placed. Gloves are changed and attention was   turned to the abdomen.  A 5 mm umbilical incision was made and the   Optiview technique was used to place a 5 mm trocar in this incision. Pneumoperitoneum was obtained. There is no injury apparent from   laparoscopic entry. After survey of the pelvis, additional ports were   placed in the left lower quadrant and in the right upper quadrant, both 5   mm size. The course of the ureter on both sides was carefully followed   and identified and found to be for the most part away from the   operative field. The first lesion in the left cul-de-sac is grasped using a   toothed grasper and the Harmonic Ace +7 was used to incise the   peritoneum underlying this lesion and a combination of blunt and   Harmonic dissection was used to dissect a representative portion of   the peritoneum, including all of the endometriosis-laden tissue from   that area, which was then submitted. Attention was then turned to the left sidewall where similarly a patch of   several endometriosis implants overlying the left ureter was noted. The   peritoneum was carefully tented up and away from the ureter and the   Harmonic used to remove this entire portion of peritoneum as well. The   course of the ureter and the ureter itself was identified and kept well   away from the dissection. An additional implant in the left ovarian fossa   is also removed at this time and felt at this time that all of the left-sided   endometriosis burden had been excised. Attention was then turned to the right. The most extensive lesion on   the right is the right side wall. Once again, this overlies the ureter but   great care is taken to clear the ureter away, mobilizing it laterally so as   to be able to medially peel away the endometriosis-laden peritoneum. The Harmonic Ace was used for this dissection as well.  Once   dissection of peritoneum was handed off, attention finally was turned to   the right cul-de-sac where a small lesion is excised right off of the area   of the right uterosacral ligament and also sent for permanent section. This being the extent of the endometriosis in the pelvis, Interceed was   then overlaid and the laparoscopy portion of the procedure was   finished. The skin incisions were injected with 0.5% Marcaine with   epinephrine and closed with 4-0 Monocryl in a subcuticular fashion,   followed by Dermabond. Attention was then turned back to the perineum. The Hulka   manipulator was removed and direct pressure provided excellent   hemostasis at the cervix. Once this was done, the skin tags in the   perineum are raised one by one, using Adson pickups and excised   sharply using Metzenbaum scissors. Once they were all removed,   sutures of 4-0 Monocryl were used to affect excellent hemostasis and   skin closure. The hemorrhoid is avoided. Once this was finished, the   patient was cleaned up and awakened from anesthesia. Instrument,   lap, and needle counts were correct x2. She was taken to recovery in   stable condition.         MD Kelin Westfall   D:  09/11/2017   12:26   T:  09/11/2017   12:59   Job #:  [id number]

## 2017-09-11 NOTE — IP AVS SNAPSHOT
[address] [facility] 
[phone] Patient: Tricia Fish MRN: [id number] :1995 You are allergic to the following No active allergies Recent Documentation Height Weight BMI OB Status Smoking Status 1.6 m 63.2 kg 24.68 kg/m2 Having regular periods Never Smoker Emergency Contacts Name Discharge Info Relation Home Work Mobile [facility] [3] Parent [1] [phone] About your hospitalization You were admitted on:  2017 You last received care in the:  [facility] You were discharged on:  2017 Unit phone number:  [phone] Why you were hospitalized Your primary diagnosis was:  Not on File Providers Seen During Your Hospitalizations Provider Role Specialty Primary office phone Brigid Horne MD Attending Provider Obstetrics & Gynecology [phone] Your Primary Care Physician (PCP) Primary Care Physician Office Phone Office Fax [facility] Cara [phone] Follow-up Information Follow up With Details Comments Contact Info Suman Escoto MD   [address] [facility] 
[phone] Brigid Horne MD In 6 weeks  [address] 
[phone] Current Discharge Medication List  
  
START taking these medications Dose & Instructions Dispensing Information Comments Morning Noon Evening Bedtime  
 ibuprofen 800 mg tablet Commonly known as:  MOTRIN Your last dose was: Your next dose is:    
   
   
 Dose:  800 mg Take 1 Tab by mouth every eight (8) hours as needed for Pain. Quantity:  30 Tab Refills:  1  
     
   
   
   
  
 oxyCODONE-acetaminophen 5-325 mg per tablet Commonly known as:  PERCOCET  
   
 Your last dose was: Your next dose is:    
   
   
 Dose:  2 Tab Take 2 Tabs by mouth every six (6) hours as needed for Pain. Max Daily Amount: 8 Tabs. Quantity:  30 Tab Refills:  0 CONTINUE these medications which have NOT CHANGED Dose & Instructions Dispensing Information Comments Morning Noon Evening Bedtime  
 busPIRone 7.5 mg tablet Commonly known as:  BUSPAR Your last dose was: Your next dose is:    
   
   
 Dose:  15 mg Take 15 mg by mouth two (2) times a day. Indications: GENERALIZED ANXIETY DISORDER Refills:  0  
     
   
   
   
  
 escitalopram oxalate 10 mg tablet Commonly known as:  Colen Brunner Your last dose was: Your next dose is:    
   
   
 Dose:  30 mg Take 30 mg by mouth daily. Refills:  0 LO LOESTRIN FE 1 mg-10 mcg (24)/10 mcg (2) Tab Generic drug:  norethindrone-e.estradiol-iron Your last dose was: Your next dose is:    
   
   
 TK 1 T PO QD Refills:  10  
     
   
   
   
  
 oxybutynin chloride XL 15 mg CR tablet Commonly known as:  DITROPAN XL Your last dose was: Your next dose is:    
   
   
 Dose:  15 mg Take 15 mg by mouth daily. Refills:  0  
     
   
   
   
  
 URIBEL 118-10-40.8-36 mg Cap capsule Generic drug:  Mth-Me Blue-Sod Phos-PhSal-Hyo Your last dose was: Your next dose is:    
   
   
  Refills:  3  
     
   
   
   
  
 valACYclovir 1 gram tablet Commonly known as:  VALTREX Your last dose was: Your next dose is:    
   
   
 Dose:  1000 mg Take 1,000 mg by mouth nightly. Refills:  11 VESIcare 10 mg tablet Generic drug:  solifenacin Your last dose was: Your next dose is:    
   
   
 Dose:  10 mg Take 10 mg by mouth daily. Refills:  0 STOP taking these medications TRI-PREVIFEM (28) 0.18/0.215/0.25 mg-35 mcg (28) Tab Generic drug:  norgestimate-ethinyl estradiol Where to Get Your Medications Information on where to get these meds will be given to you by the nurse or doctor. ! Ask your nurse or doctor about these medications  
  ibuprofen 800 mg tablet  
 oxyCODONE-acetaminophen 5-325 mg per tablet Discharge Instructions After Care Instructions For Your Laparoscopy 1. You may resume your usual diet once the nausea resolves. Initially, try sips of warm fluids and a bland diet. 2. Avoid heavy lifting or straining. Gradually increase your activity. First try walking and doing light activity around the house. You may resume your normal habits if no significant discomfort or bleeding develops. Most women can return to work within 2-7 days after this procedure. 3. Sexual intercourse can be resumed as soon as desired provided the discomfort following surgery has subsided. 4. You may take showers or tub baths. It is also safe to swim or use hot tubs once you feel up to it. 5. Some lower abdominal cramping typically occurs after this procedure. Tylenol, Motrin or your prescribed pain medication should relieve this discomfort. You should notice steady improvement in the pain over the next day or so. It is also not unusual to experience some discomfort under your ribs or to notice neck or shoulder soreness. This is due to gas used during the surgery to help the physicians see your pelvic organs. The gas is reabsorbed over a 24 hour course. 6. It is not unusual to have vaginal spotting lasting 2-3 days. It is difficult to predict when your next menstrual period will occur as your body has undergone a major stress. Your period should regulate itself within the first 6 weeks post-op. 7. A bruise may appear around the incision and will gradually resolve as it heals. 8. The suture used to close the incision may be visible above the skin. If so, it will be removed at your office visit. However, frequently sutures are placed below the skin and will reabsorb on their own. There will be no need for removal.  
 
9. Call the office at [phone] to report any of the following problems: Abdominal pain that is increasing in severity, heavy vaginal bleeding, drainage or separation of your incision site, temperature greater than 100.4 or persistent nausea and vomiting. 10. You should be seen in the office following your surgery. Call for an appointment if this has not already been arranged. At this appointment, the findings noted at the time of your procedure will be discussed. 11. You may remove the dressing from your incision after 12 hours. Ibuprofen (Advil, Advil Children's, Motrin, Children's Ibuprofen) - (By mouth) Why this medicine is used:  
Treats pain and fever. This medicine is an NSAID. Contact a nurse or doctor right away if you have: 
· Change in how much or how often you urinate · Severe stomach pain, vomiting blood, bloody or black tarry stools · Swelling in your hands, ankles, or feet; rapid weight gain Common side effects: 
· Constipation, diarrhea, gas, mild upset stomach · Ringing in your ears, dizziness, headache © 2017 [address]  Information is for End User's use only and may not be sold, redistributed or otherwise used for commercial purposes. Narcotic-Analgesic/Acetaminophen (Percocet, Norco, Lorcet HD, Lortab 10/325) - (By mouth) Why this medicine is used:  
Relieves pain. Contact a nurse or doctor right away if you have: 
· Extreme weakness, shallow breathing, slow heartbeat · Severe confusion, lightheadedness, dizziness, fainting · Yellow skin or eyes, dark urine or pale stools · Severe constipation, severe stomach pain, nausea, vomiting, loss of appetite · Sweating or cold, clammy skin Common side effects: · Mild constipation, nausea, vomiting · Sleepiness, tiredness · Itching, rash © 2017 [address] [place] Information is for End User's use only and may not be sold, redistributed or otherwise used for commercial purposes. DISCHARGE SUMMARY from Nurse The following personal items are in your possession at time of discharge: 
 
Dental Appliances: None Visual Aid: None Hearing Aids/Status: Does not own PATIENT INSTRUCTIONS: 
 
 
F-face looks uneven A-arms unable to move or move unevenly S-speech slurred or non-existent T-time-call 911 as soon as signs and symptoms begin-DO NOT go Back to bed or wait to see if you get better-TIME IS BRAIN. Warning Signs of HEART ATTACK Call 911 if you have these symptoms: 
? Chest discomfort. Most heart attacks involve discomfort in the center of the chest that lasts more than a few minutes, or that goes away and comes back. It can feel like uncomfortable pressure, squeezing, fullness, or pain. ? Discomfort in other areas of the upper body. Symptoms can include pain or discomfort in one or both arms, the back, neck, jaw, or stomach. ? Shortness of breath with or without chest discomfort. ? Other signs may include breaking out in a cold sweat, nausea, or lightheadedness. Don't wait more than five minutes to call [address]! Fast action can save your life. Calling 911 is almost always the fastest way to get lifesaving treatment. Emergency Medical Services staff can begin treatment when they arrive  up to an hour sooner than if someone gets to the hospital by car. The discharge information has been reviewed with the patient and parent. The patient and parent verbalized understanding. Discharge medications reviewed with the patient and mother and appropriate educational materials and side effects teaching were provided. Discharge Orders None Introducing [facility]! Dear Daphney Dolan: Thank you for requesting a [employer] account. Our records indicate that you already have an active [employer] account. You can access your account anytime at [email or web address]. [email or web address] Did you know that you can access your hospital and ER discharge instructions at any time in [employer]? You can also review all of your test results from your hospital stay or ER visit. Additional Information If you have questions, please visit the Frequently Asked Questions section of the [employer] website at [email or web address]. [email or web address]/. Remember, [employer] is NOT to be used for urgent needs. For medical emergencies, dial 911. Now available from your iPhone and Android! General Information Please provide this summary of care documentation to your next provider. Patient Signature:  ____________________________________________________________ Date:  ____________________________________________________________  
  
Lucille Randy Provider Signature:  ____________________________________________________________ Date:  ____________________________________________________________

## 2017-09-11 NOTE — BRIEF OP NOTE
BRIEF OPERATIVE NOTE    Date of Procedure: 9/11/2017   Preoperative Diagnosis: ENDOMETRIOSIS AND SKIN TAG OF THE VULVA AND PERINEUM  Postoperative Diagnosis: ENDOMETRIOSIS AND SKIN TAG OF THE VULVA AND PERINEUM    Procedure(s):  LAPAROSCOPIC EXCISION OF ENDOMETRIOSIS AND EXCISION OF PERINEUM SKIN TAGS  Surgeon(s) and Role:     * Elizabeth Lechuga MD - Primary      Surgical Staff:  Circ-1: Kristina Sow RN  Scrub Tech-1: Unice Davenport Tech-2: Catha Daring  Surg Asst-1: Ivette Hess McPhipps  Event Time In   Incision Start 1057   Incision Close 1201     Anesthesia: General   Estimated Blood Loss: 25 cc  Specimens:   ID Type Source Tests Collected by Time Destination   1 : Left Cul de Sac Preservative   Elizabeth Lechuga MD 9/11/2017 1118 Pathology   2 : Left Sidewall Preservative   Elizabeth Lechuga MD 9/11/2017 1122 Pathology   3 : Left Ovarian Fossa Preservative   Elizabeth Lechuga MD 9/11/2017 1123 Pathology   4 : Right Sidewall Preservative   Elizabeth Lechuga MD 9/11/2017 1138 Pathology   5 : Right Cul de Sac Preservative   Elizabeth Lechuga MD 9/11/2017 1141 Pathology   6 : Perianal Skin Tags Preservative   Elizabeth Lechuga MD 9/11/2017 1154 Pathology      Findings: There is endometriosis noted in small patches across the breadth of the posterior pelvis, in the left ovarian fossa, left and right sidewalls, and left and right cul de sac peritoneum. The uterus and ovaries appear normal.  The upper abdominal anatomy is also normal.   There are several coalesced skin tags on the perineum approaching the anal verge and there is also an inflamed hemorrhoid, which is avoided.     Complications: none  Dict: [id number]

## 2019-01-28 ENCOUNTER — OFFICE VISIT (OUTPATIENT)
Dept: INTERNAL MEDICINE CLINIC | Age: 24
End: 2019-01-28

## 2019-01-28 VITALS
SYSTOLIC BLOOD PRESSURE: 94 MMHG | OXYGEN SATURATION: 98 % | DIASTOLIC BLOOD PRESSURE: 63 MMHG | WEIGHT: 153.2 LBS | BODY MASS INDEX: 27.14 KG/M2 | RESPIRATION RATE: 20 BRPM | HEART RATE: 70 BPM | TEMPERATURE: 98.9 F | HEIGHT: 63 IN

## 2019-01-28 DIAGNOSIS — Z00.00 ROUTINE GENERAL MEDICAL EXAMINATION AT A HEALTH CARE FACILITY: Primary | ICD-10-CM

## 2019-01-28 NOTE — PROGRESS NOTES
SUBJECTIVE Ms. Callie Amaral presents today for CPE. Chief Complaint Patient presents with  Complete Physical  
 
 
She completed her degree at [employer] and is working in her one of her [employer]. She is seeing OB-Gyn, Dr. Noreen Pena. Still has recurring UTI. She went to [facility] last month to see a urogynecologist. No longer sees Dr. Kaity Medina. No longer seeing psychiatrist. Denies depressed mood or mood lability, at this time. At this time, she is otherwise doing well and has brought no other complaints to my attention today. For a list of the medical issues addressed today, see the assessment and plan below. PMH:  
Past Medical History:  
Diagnosis Date  Depression  Endometriosis  Interstitial cystitis  Leukocytosis 10/11/2013  Other ill-defined conditions(799.89) mrsa  Psychiatric disorder OCD anxiety  Psychotic disorder ([address]  Right upper quadrant abdominal pain 10/11/2013  Sepsis ([facility] [address] Past Surgical History:  
Procedure Laterality Date  ABDOMEN SURGERY PROC UNLISTED    
 polyp removed from intestines at age 3  
 HX GYN    
 ENDOMETRIOUS  
 HX OTHER SURGICAL  2017  
 endometriosis  HX TONSIL AND ADENOIDECTOMY  HX WISDOM TEETH EXTRACTION All: She has No Known Allergies. Current Outpatient Medications Medication Sig  ibuprofen (MOTRIN) 800 mg tablet Take 1 Tab by mouth every eight (8) hours as needed for Pain.  LO LOESTRIN FE 1 mg-10 mcg (24)/10 mcg (2) tab TK 1 T PO QD  
 escitalopram (LEXAPRO) 10 mg tablet Take 30 mg by mouth daily.  busPIRone (BUSPAR) 7.5 mg tablet Take 15 mg by mouth two (2) times a day. Indications: GENERALIZED ANXIETY DISORDER  
 oxyCODONE-acetaminophen (PERCOCET) 5-325 mg per tablet Take 2 Tabs by mouth every six (6) hours as needed for Pain. Max Daily Amount: 8 Tabs.  solifenacin (VESICARE) 10 mg tablet Take 10 mg by mouth daily.  oxybutynin chloride XL (DITROPAN XL) 15 mg CR tablet Take 15 mg by mouth daily.  URIBEL 118-10-40.8-36 mg cap capsule  valACYclovir (VALTREX) 1 gram tablet Take 1,000 mg by mouth nightly. No current facility-administered medications for this visit. FH: Her family history includes Cancer in her mother. SH: She is working in [employer]. She reports that  has never smoked. she has never used smokeless tobacco. She reports that she drinks alcohol. She reports that she does not use drugs. ROS: See above; Complete ROS otherwise negative. OBJECTIVE:  
Vitals:  
Visit Vitals BP 94/63 (BP 1 Location: Left arm, BP Patient Position: Sitting) Pulse 70 Temp 98.9 °F (37.2 °C) (Oral) Resp 20 Ht 5' 3\" (1.6 m) Wt 153 lb 3.2 oz (69.5 kg) SpO2 98% BMI 27.14 kg/m² Gen: Pleasant 24 y.o.  female in NAD. HEENT: PERRLA. EOMI. OP moist and pink. Neck: Supple. No LAD. HEART: RRR, No M/G/R.    LUNGS: CTAB No W/R. ABDOMEN: S, NT, ND, BS+. EXTREMITIES: Warm. No C/C/E.  MUSCULOSKELETAL: Normal ROM, muscle strength 5/5 all groups. NEURO: Alert and oriented x 3. Cranial nerves grossly intact. No focal sensory or motor deficits noted. SKIN: Warm. Dry. No rashes or other lesions noted. Lab Results Component Value Date/Time Sodium 139 04/26/2016 11:12 AM  
 Potassium 4.6 04/26/2016 11:12 AM  
 Chloride 101 04/26/2016 11:12 AM  
 CO2 22 04/26/2016 11:12 AM  
 Anion gap 6 06/08/2014 01:25 PM  
 Glucose 73 04/26/2016 11:12 AM  
 BUN 8 04/26/2016 11:12 AM  
 Creatinine 0.70 04/26/2016 11:12 AM  
 BUN/Creatinine ratio 11 04/26/2016 11:12 AM  
 GFR est  04/26/2016 11:12 AM  
 GFR est non- 04/26/2016 11:12 AM  
 Calcium 9.1 04/26/2016 11:12 AM  
 Bilirubin, total 0.4 04/26/2016 11:12 AM  
 ALT (SGPT) 15 04/26/2016 11:12 AM  
 AST (SGOT) 26 04/26/2016 11:12 AM  
 Alk.  phosphatase 55 04/26/2016 11:12 AM  
 Protein, total 6.7 04/26/2016 11:12 AM  
 Albumin 4.1 04/26/2016 11:12 AM  
 Globulin 4.4 (H) 06/08/2014 01:25 PM  
 A-G Ratio 1.6 04/26/2016 11:12 AM  
 
 
Lab Results Component Value Date/Time WBC 9.1 04/26/2016 11:12 AM  
 Hemoglobin (POC) 12.3 07/12/2013 07:08 AM  
 HGB 12.2 04/26/2016 11:12 AM  
 HCT 37.4 04/26/2016 11:12 AM  
 PLATELET 965 66/46/2095 11:12 AM  
 MCV 91 04/26/2016 11:12 AM  
 
 
 
ASSESSMENT/ PLAN: Billy Najera was seen today for complete physical. 
 
Routine general medical examination at a health care facility: Normal exam.  
Orders: -     METABOLIC PANEL, COMPREHENSIVE 
-     CBC WITH AUTOMATED DIFF 
-     TSH 3RD GENERATION 
-     VITAMIN D, 25 HYDROXY Screening, lipid Orders: 
-     LIPID PANEL Follow-up Disposition: 
Return in about 1 year (around 1/28/2020) for CPE. I have reviewed the patient's medications and risks/side effects/benefits were discussed. Diagnosis(-es) explained to patient and questions answered. Literature provided where appropriate.

## 2019-01-28 NOTE — PATIENT INSTRUCTIONS

## 2019-01-28 NOTE — PROGRESS NOTES
1. Have you been to the ER, urgent care clinic since your last visit? Hospitalized since your last visit?mo 2. Have you seen or consulted any other health care providers outside of the [address] [place] [address] since your last visit? Include any pap smears or colon screening.  no

## 2019-04-23 LAB
ALBUMIN SERPL-MCNC: 4.5 G/DL (ref 3.5–5.5)
ALBUMIN/GLOB SERPL: 1.8 {RATIO} (ref 1.2–2.2)
ALP SERPL-CCNC: 62 IU/L (ref 39–117)
ALT SERPL-CCNC: 31 IU/L (ref 0–32)
AST SERPL-CCNC: 27 IU/L (ref 0–40)
BASOPHILS # BLD AUTO: 0 X10E3/UL (ref 0–0.2)
BASOPHILS NFR BLD AUTO: 0 %
BILIRUB SERPL-MCNC: 0.3 MG/DL (ref 0–1.2)
BUN SERPL-MCNC: 9 MG/DL (ref 6–20)
BUN/CREAT SERPL: 13 (ref 9–23)
CALCIUM SERPL-MCNC: 9.3 MG/DL (ref 8.7–10.2)
CHLORIDE SERPL-SCNC: 101 MMOL/L (ref 96–106)
CHOLEST SERPL-MCNC: 184 MG/DL (ref 100–199)
CO2 SERPL-SCNC: 25 MMOL/L (ref 20–29)
CREAT SERPL-MCNC: 0.71 MG/DL (ref 0.57–1)
EOSINOPHIL # BLD AUTO: 0.1 X10E3/UL (ref 0–0.4)
EOSINOPHIL NFR BLD AUTO: 2 %
ERYTHROCYTE [DISTWIDTH] IN BLOOD BY AUTOMATED COUNT: 13.6 % (ref 12.3–15.4)
GLOBULIN SER CALC-MCNC: 2.5 G/DL (ref 1.5–4.5)
GLUCOSE SERPL-MCNC: 85 MG/DL (ref 65–99)
HCT VFR BLD AUTO: 36.9 % (ref 34–46.6)
HDLC SERPL-MCNC: 56 MG/DL
HGB BLD-MCNC: 12.6 G/DL (ref 11.1–15.9)
IMM GRANULOCYTES # BLD AUTO: 0 X10E3/UL (ref 0–0.1)
IMM GRANULOCYTES NFR BLD AUTO: 0 %
LDLC SERPL CALC-MCNC: 115 MG/DL (ref 0–99)
LYMPHOCYTES # BLD AUTO: 2.1 X10E3/UL (ref 0.7–3.1)
LYMPHOCYTES NFR BLD AUTO: 39 %
MCH RBC QN AUTO: 30.5 PG (ref 26.6–33)
MCHC RBC AUTO-ENTMCNC: 34.1 G/DL (ref 31.5–35.7)
MCV RBC AUTO: 89 FL (ref 79–97)
MONOCYTES # BLD AUTO: 0.6 X10E3/UL (ref 0.1–0.9)
MONOCYTES NFR BLD AUTO: 11 %
NEUTROPHILS # BLD AUTO: 2.5 X10E3/UL (ref 1.4–7)
NEUTROPHILS NFR BLD AUTO: 48 %
PLATELET # BLD AUTO: 315 X10E3/UL (ref 150–379)
POTASSIUM SERPL-SCNC: 4.7 MMOL/L (ref 3.5–5.2)
PROT SERPL-MCNC: 7 G/DL (ref 6–8.5)
RBC # BLD AUTO: 4.13 X10E6/UL (ref 3.77–5.28)
SODIUM SERPL-SCNC: 139 MMOL/L (ref 134–144)
TRIGL SERPL-MCNC: 66 MG/DL (ref 0–149)
VLDLC SERPL CALC-MCNC: 13 MG/DL (ref 5–40)
WBC # BLD AUTO: 5.2 X10E3/UL (ref 3.4–10.8)

## 2019-05-22 ENCOUNTER — TELEPHONE (OUTPATIENT)
Dept: SLEEP MEDICINE | Age: 24
End: 2019-05-22

## 2019-05-22 NOTE — TELEPHONE ENCOUNTER
FARHEEN on 05/22/2019 for patient to call the office to schedule a sleep medicine consult per Dr. Lizbeth Restrepo.

## 2020-02-04 ENCOUNTER — TELEPHONE (OUTPATIENT)
Dept: INTERNAL MEDICINE CLINIC | Age: 25
End: 2020-02-04

## 2020-02-04 DIAGNOSIS — Z20.828 EXPOSURE TO MONONUCLEOSIS SYNDROME: Primary | ICD-10-CM

## 2020-02-04 NOTE — TELEPHONE ENCOUNTER
Identified patient 2 identifiers verified. Patient  requesting testing for Mono because she has been in contact with a friend who has Mono. She has an appt. With Dr. Lucia Garcia this month.

## 2020-02-21 ENCOUNTER — OFFICE VISIT (OUTPATIENT)
Dept: INTERNAL MEDICINE CLINIC | Age: 25
End: 2020-02-21

## 2020-02-21 VITALS
WEIGHT: 150.4 LBS | OXYGEN SATURATION: 100 % | SYSTOLIC BLOOD PRESSURE: 95 MMHG | HEIGHT: 63 IN | RESPIRATION RATE: 18 BRPM | BODY MASS INDEX: 26.65 KG/M2 | TEMPERATURE: 99.3 F | HEART RATE: 69 BPM | DIASTOLIC BLOOD PRESSURE: 60 MMHG

## 2020-02-21 DIAGNOSIS — Z00.00 ROUTINE GENERAL MEDICAL EXAMINATION AT A HEALTH CARE FACILITY: Primary | ICD-10-CM

## 2020-02-21 RX ORDER — VALACYCLOVIR HYDROCHLORIDE 1 G/1
TABLET, FILM COATED ORAL
COMMUNITY
Start: 2016-02-14

## 2020-02-21 NOTE — PATIENT INSTRUCTIONS
Office Policies    Phone calls/patient messages:            Please allow up to 24 hours for someone in the office to contact you about your call or message. Be mindful your provider may be out of the office or your message may require further review. We encourage you to use [employer] for your messages as this is a faster, more efficient way to communicate with our office                         Medication Refills:            Prescription medications require 48-72 business hours to process. We encourage you to use [employer] for your refills. For controlled medications: Please allow 72 business hours to process. Certain medications may require you to  a written prescription at our office. NO narcotic/controlled medications will be prescribed after 4pm Monday through Friday or on weekends              Form/Paperwork Completion:            Please note a $25 fee may incur for all paperwork for completed by our providers. We ask that you allow 7-10 business days. Pre-payment is due prior to picking up/faxing the completed form. You may also download your forms to [employer] to have your doctor print off.      1. Have you been to the ER, urgent care clinic since your last visit? Hospitalized since your last visit? \NO    2. Have you seen or consulted any other health care providers outside of the [address] [place] [address] since your last visit? Include any pap smears or colon screening. NO       Well Visit, Ages 25 to 48: Care Instructions  Your Care Instructions    Physical exams can help you stay healthy. Your doctor has checked your overall health and may have suggested ways to take good care of yourself. He or she also may have recommended tests. At home, you can help prevent illness with healthy eating, regular exercise, and other steps. Follow-up care is a key part of your treatment and safety. Be sure to make and go to all appointments, and call your doctor if you are having problems.  It's also a good idea to know your test results and keep a list of the medicines you take. How can you care for yourself at home? · Reach and stay at a healthy weight. This will lower your risk for many problems, such as obesity, diabetes, heart disease, and high blood pressure. · Get at least 30 minutes of physical activity on most days of the week. Walking is a good choice. You also may want to do other activities, such as running, swimming, cycling, or playing tennis or team sports. Discuss any changes in your exercise program with your doctor. · Do not smoke or allow others to smoke around you. If you need help quitting, talk to your doctor about stop-smoking programs and medicines. These can increase your chances of quitting for good. · Talk to your doctor about whether you have any risk factors for sexually transmitted infections (STIs). Having one sex partner (who does not have STIs and does not have sex with anyone else) is a good way to avoid these infections. · Use birth control if you do not want to have children at this time. Talk with your doctor about the choices available and what might be best for you. · Protect your skin from too much sun. When you're outdoors from 10 a.m. to 4 p.m., stay in the shade or cover up with clothing and a hat with a wide brim. Wear sunglasses that block UV rays. Even when it's cloudy, put broad-spectrum sunscreen (SPF 30 or higher) on any exposed skin. · See a dentist one or two times a year for checkups and to have your teeth cleaned. · Wear a seat belt in the car. Follow your doctor's advice about when to have certain tests. These tests can spot problems early. For everyone  · Cholesterol. Have the fat (cholesterol) in your blood tested after age 21. Your doctor will tell you how often to have this done based on your age, family history, or other things that can increase your risk for heart disease. · Blood pressure.  Have your blood pressure checked during a routine doctor visit. Your doctor will tell you how often to check your blood pressure based on your age, your blood pressure results, and other factors. · Vision. Talk with your doctor about how often to have a glaucoma test.  · Diabetes. Ask your doctor whether you should have tests for diabetes. · Colon cancer. Your risk for colorectal cancer gets higher as you get older. Some experts say that adults should start regular screening at age 48 and stop at age 76. Others say to start before age 48 or continue after age 76. Talk with your doctor about your risk and when to start and stop screening. For women  · Breast exam and mammogram. Talk to your doctor about when you should have a clinical breast exam and a mammogram. Medical experts differ on whether and how often women under 50 should have these tests. Your doctor can help you decide what is right for you. · Cervical cancer screening test and pelvic exam. Begin with a Pap test at age 24. The test often is part of a pelvic exam. Starting at age 27, you may choose to have a Pap test, an HPV test, or both tests at the same time (called co-testing). Talk with your doctor about how often to have testing. · Tests for sexually transmitted infections (STIs). Ask whether you should have tests for STIs. You may be at risk if you have sex with more than one person, especially if your partners do not wear condoms. For men  · Tests for sexually transmitted infections (STIs). Ask whether you should have tests for STIs. You may be at risk if you have sex with more than one person, especially if you do not wear a condom. · Testicular cancer exam. Ask your doctor whether you should check your testicles regularly. · Prostate exam. Talk to your doctor about whether you should have a blood test (called a PSA test) for prostate cancer.  Experts differ on whether and when men should have this test. Some experts suggest it if you are older than 39 and are -American or have a father or brother who got prostate cancer when he was younger than 72. When should you call for help? Watch closely for changes in your health, and be sure to contact your doctor if you have any problems or symptoms that concern you. Where can you learn more? Go to http://karley-estela.info/. Enter P072 in the search box to learn more about \"Well Visit, Ages 25 to 48: Care Instructions. \"  Current as of: December 13, 2018  Content Version: 12.2  © 0217-5967 [employer]. Care instructions adapted under license by [employer] (which disclaims liability or warranty for this information). If you have questions about a medical condition or this instruction, always ask your healthcare professional. [address] any warranty or liability for your use of this information.

## 2020-02-21 NOTE — PROGRESS NOTES
SUBJECTIVE  Ms. Sabas Wilkerson presents today for CPE. Chief Complaint   Patient presents with    Complete Physical     pt here today for CPE        She completed her degree at [employer] and is working in her one of her [employer]. She is seeing OB-Gyn, Dr. Kina Liu. Still has recurring UTI. She went once to [facility] last month to see a urogynecologist. She sees someone at [facility]. No longer seeing psychiatrist. Denies depressed mood or mood lability, at this time. At this time, she is otherwise doing well and has brought no other complaints to my attention today. For a list of the medical issues addressed today, see the assessment and plan below. PMH:   Past Medical History:   Diagnosis Date    Depression     Endometriosis     Interstitial cystitis     Leukocytosis 10/11/2013    Other ill-defined conditions(799.89)     mrsa    Psychiatric disorder     OCD anxiety    Psychotic disorder ([address]     Right upper quadrant abdominal pain 10/11/2013    [facility]eps[facility])        Past Surgical History:   Procedure Laterality Date    ABDOMEN SURGERY PROC UNLISTED      polyp removed from intestines at age 1   [de-identified] GYN      ENDOMETRIOUS    HX OTHER SURGICAL  2017    endometriosis    HX TONSIL AND ADENOIDECTOMY      HX WISDOM TEETH EXTRACTION         All: She has No Known Allergies. Current Outpatient Medications   Medication Sig    valACYclovir (VALTREX) 1 gram tablet valacyclovir 1 gram tablet   TAKE 1 TABLET BY MOUTH EVERY DAY    ibuprofen (MOTRIN) 800 mg tablet Take 1 Tab by mouth every eight (8) hours as needed for Pain.  escitalopram (LEXAPRO) 10 mg tablet Take 30 mg by mouth daily.  busPIRone (BUSPAR) 7.5 mg tablet Take 15 mg by mouth two (2) times a day. Indications: GENERALIZED ANXIETY DISORDER    oxyCODONE-acetaminophen (PERCOCET) 5-325 mg per tablet Take 2 Tabs by mouth every six (6) hours as needed for Pain. Max Daily Amount: 8 Tabs.     solifenacin (VESICARE) 10 mg tablet Take 10 mg by mouth daily.  LO LOESTRIN FE 1 mg-10 mcg (24)/10 mcg (2) tab TK 1 T PO QD    oxybutynin chloride XL (DITROPAN XL) 15 mg CR tablet Take 15 mg by mouth daily.  URIBEL 118-10-40.8-36 mg cap capsule     valACYclovir (VALTREX) 1 gram tablet Take 1,000 mg by mouth nightly. No current facility-administered medications for this visit. FH: Her family history includes Cancer in her mother. SH: She is working in [employer]. She reports that she has never smoked. She has never used smokeless tobacco. She reports current alcohol use. She reports that she does not use drugs. ROS: See above; Complete ROS otherwise negative. OBJECTIVE:   Vitals:   Visit Vitals  BP 95/60 (BP 1 Location: Left arm, BP Patient Position: Sitting)   Pulse 69   Temp 99.3 °F (37.4 °C) (Oral)   Resp 18   Ht 5' 3\" (1.6 m)   Wt 150 lb 6.4 oz (68.2 kg)   SpO2 100%   BMI 26.64 kg/m²      Gen: Pleasant 21 y.o.  female in NAD. HEENT: PERRLA. EOMI. OP moist and pink. Neck: Supple. No LAD. HEART: RRR, No M/G/R.    LUNGS: CTAB No W/R. ABDOMEN: S, NT, ND, BS+. EXTREMITIES: Warm. No C/C/E.  MUSCULOSKELETAL: Normal ROM, muscle strength 5/5 all groups. NEURO: Alert and oriented x 3. Cranial nerves grossly intact. No focal sensory or motor deficits noted. SKIN: Warm. Dry. No rashes or other lesions noted. Lab Results   Component Value Date/Time    Sodium 139 04/22/2019 10:11 AM    Potassium 4.7 04/22/2019 10:11 AM    Chloride 101 04/22/2019 10:11 AM    CO2 25 04/22/2019 10:11 AM    Anion gap 6 06/08/2014 01:25 PM    Glucose 85 04/22/2019 10:11 AM    BUN 9 04/22/2019 10:11 AM    Creatinine 0.71 04/22/2019 10:11 AM    BUN/Creatinine ratio 13 04/22/2019 10:11 AM    GFR est  04/22/2019 10:11 AM    GFR est non- 04/22/2019 10:11 AM    Calcium 9.3 04/22/2019 10:11 AM    Bilirubin, total 0.3 04/22/2019 10:11 AM    ALT (SGPT) 31 04/22/2019 10:11 AM    AST (SGOT) 27 04/22/2019 10:11 AM    Alk.  phosphatase 62 04/22/2019 10:11 AM    Protein, total 7.0 04/22/2019 10:11 AM    Albumin 4.5 04/22/2019 10:11 AM    Globulin 4.4 (H) 06/08/2014 01:25 PM    A-G Ratio 1.8 04/22/2019 10:11 AM       Lab Results   Component Value Date/Time    WBC 5.2 04/22/2019 10:11 AM    Hemoglobin (POC) 12.3 07/12/2013 07:08 AM    HGB 12.6 04/22/2019 10:11 AM    HCT 36.9 04/22/2019 10:11 AM    PLATELET 503 66/27/5497 10:11 AM    MCV 89 04/22/2019 10:11 AM         ASSESSMENT/ PLAN: Rosalind Vallejo was seen today for complete physical.    Routine general medical examination at a health care facility: Normal exam.   Orders:  -     METABOLIC PANEL, COMPREHENSIVE  -     CBC WITH AUTOMATED DIFF  -     TSH 3RD GENERATION  -     VITAMIN D, 25 HYDROXY    Screening, lipid  Orders:  -     LIPID PANEL        I have reviewed the patient's medications and risks/side effects/benefits were discussed. Diagnosis(-es) explained to patient and questions answered. Literature provided where appropriate. Follow-up and Dispositions    · Return in about 1 year (around 2/21/2021) for CPE.

## 2020-09-01 LAB
BASOPHILS # BLD AUTO: 0.1 X10E3/UL (ref 0–0.2)
BASOPHILS NFR BLD AUTO: 1 %
CHOLEST SERPL-MCNC: 135 MG/DL (ref 100–199)
COMMENT:: NORMAL
EOSINOPHIL # BLD AUTO: 0.1 X10E3/UL (ref 0–0.4)
EOSINOPHIL NFR BLD AUTO: 1 %
ERYTHROCYTE [DISTWIDTH] IN BLOOD BY AUTOMATED COUNT: 12.5 % (ref 11.7–15.4)
HCT VFR BLD AUTO: 37.6 % (ref 34–46.6)
HDLC SERPL-MCNC: 47 MG/DL
HGB BLD-MCNC: 12.4 G/DL (ref 11.1–15.9)
IMM GRANULOCYTES # BLD AUTO: 0 X10E3/UL (ref 0–0.1)
IMM GRANULOCYTES NFR BLD AUTO: 0 %
LDLC SERPL CALC-MCNC: 77 MG/DL (ref 0–99)
LYMPHOCYTES # BLD AUTO: 2.4 X10E3/UL (ref 0.7–3.1)
LYMPHOCYTES NFR BLD AUTO: 42 %
MCH RBC QN AUTO: 31.3 PG (ref 26.6–33)
MCHC RBC AUTO-ENTMCNC: 33 G/DL (ref 31.5–35.7)
MCV RBC AUTO: 95 FL (ref 79–97)
MONOCYTES # BLD AUTO: 0.6 X10E3/UL (ref 0.1–0.9)
MONOCYTES NFR BLD AUTO: 10 %
NEUTROPHILS # BLD AUTO: 2.6 X10E3/UL (ref 1.4–7)
NEUTROPHILS NFR BLD AUTO: 46 %
PLATELET # BLD AUTO: 285 X10E3/UL (ref 150–450)
RBC # BLD AUTO: 3.96 X10E6/UL (ref 3.77–5.28)
TRIGL SERPL-MCNC: 50 MG/DL (ref 0–149)
VLDLC SERPL CALC-MCNC: 11 MG/DL (ref 5–40)
WBC # BLD AUTO: 5.6 X10E3/UL (ref 3.4–10.8)

## 2020-09-05 NOTE — PROGRESS NOTES
Please call the patient and let the patient know that her test result(s) is/are normal.  Thanks. Carlos A Smith.

## 2020-09-09 ENCOUNTER — OFFICE VISIT (OUTPATIENT)
Dept: INTERNAL MEDICINE CLINIC | Age: 25
End: 2020-09-09

## 2020-09-09 VITALS
DIASTOLIC BLOOD PRESSURE: 65 MMHG | HEIGHT: 63 IN | HEART RATE: 58 BPM | BODY MASS INDEX: 24.45 KG/M2 | RESPIRATION RATE: 20 BRPM | OXYGEN SATURATION: 98 % | TEMPERATURE: 97.8 F | SYSTOLIC BLOOD PRESSURE: 98 MMHG | WEIGHT: 138 LBS

## 2020-09-09 DIAGNOSIS — S39.012A BACK STRAIN, INITIAL ENCOUNTER: Primary | ICD-10-CM

## 2020-09-09 PROCEDURE — 99213 OFFICE O/P EST LOW 20 MIN: CPT | Performed by: INTERNAL MEDICINE

## 2020-09-09 RX ORDER — METHOCARBAMOL 750 MG/1
750 TABLET, FILM COATED ORAL 4 TIMES DAILY
Qty: 60 TAB | Refills: 1 | Status: SHIPPED | OUTPATIENT
Start: 2020-09-09 | End: 2022-10-13

## 2020-09-09 RX ORDER — IBUPROFEN 800 MG/1
800 TABLET ORAL
Qty: 30 TAB | Refills: 1 | Status: SHIPPED | OUTPATIENT
Start: 2020-09-09 | End: 2022-10-13

## 2020-09-09 NOTE — PROGRESS NOTES
SUBJECTIVE  Ms. Elena Liu presents today acutely for     Chief Complaint   Patient presents with    Back Pain     pt c/o mid to lower back pain from working out since last Monday; today pt rates pain 3/10; pt has been taking advil and aleeve       She was exercising 3 days ago, and hurt her back while doing overhead press. She has pain now in her right lumbar area. The pain is worse with exertion, improved with advil and aleve. OBJECTIVE  Visit Vitals  BP 98/65 (BP 1 Location: Left arm, BP Patient Position: Sitting)   Pulse (!) 58   Temp 97.8 °F (36.6 °C) (Temporal)   Resp 20   Ht 5' 3\" (1.6 m)   Wt 138 lb (62.6 kg)   SpO2 98%   BMI 24.45 kg/m²     Gen: Pleasant 21 y.o.  female in NAD.   HEENT: PERRLA. EOMI. OP moist and pink.  Neck: Supple.  No LAD.  HEART: RRR, No M/G/R.   LUNGS: CTAB No W/R.   ABDOMEN: S, NT, ND, BS+.   EXTREMITIES: Warm. No C/C/E. MUSCULOSKELETAL: +Tenderness and palbable muscle spasm in lumbar musculature to R of spine. ASSESSMENT / PLAN    ICD-10-CM ICD-9-CM    1. Back strain, initial encounter  S39.012A 847.9 ibuprofen (MOTRIN) 800 mg tablet      methocarbamoL (ROBAXIN) 750 mg tablet       I have reviewed with the patient details of the assessment and plan and all questions were answered. Relevant patient education was performed. Follow-up and Dispositions    · Return in about 1 year (around 9/9/2021) for CPE.

## 2020-09-09 NOTE — PATIENT INSTRUCTIONS
Learning About How to Have a Healthy Back  What causes back pain? Back pain is often caused by overuse, strain, or injury. For example, people often hurt their backs playing sports or working in the yard, being jolted in a car accident, or lifting something too heavy. Aging plays a part too. Your bones and muscles tend to lose strength as you age, which makes injury more likely. The spongy discs between the bones of the spine (vertebrae) may suffer from wear and tear and no longer provide enough cushion between the bones. A disc that bulges or breaks open (herniated disc) can press on nerves, causing back pain. In some people, back pain is the result of arthritis, broken vertebrae caused by bone loss (osteoporosis), illness, or a spine problem. Although most people have back pain at one time or another, there are steps you can take to make it less likely. How can you have a healthy back? Reduce stress on your back through good posture   Slumping or slouching alone may not cause low back pain. But after the back has been strained or injured, bad posture can make pain worse. · Sleep in a position that maintains your back's normal curves and on a mattress that feels comfortable. Sleep on your side with a pillow between your knees, or sleep on your back with a pillow under your knees. These positions can reduce strain on your back. · Stand and sit up straight. \"Good posture\" generally means your ears, shoulders, and hips are in a straight line. · If you must stand for a long time, put one foot on a stool, ledge, or box. Switch feet every now and then. · Sit in a chair that is low enough to let you place both feet flat on the floor with both knees nearly level with your hips. If your chair or desk is too high, use a footrest to raise your knees. Place a small pillow, a rolled-up towel, or a lumbar roll in the curve of your back if you need extra support.   · Try a kneeling chair, which helps tilt your hips forward. This takes pressure off your lower back. · Try sitting on an exercise ball. It can rock from side to side, which helps keep your back loose. · When driving, keep your knees nearly level with your hips. Sit straight, and drive with both hands on the steering wheel. Your arms should be in a slightly bent position. Reduce stress on your back through careful lifting   · Squat down, bending at the hips and knees only. If you need to, put one knee to the floor and extend your other knee in front of you, bent at a right angle (half kneeling). · Press your chest straight forward. This helps keep your upper back straight while keeping a slight arch in your low back. · Hold the load as close to your body as possible, at the level of your belly button (navel). · Use your feet to change direction, taking small steps. · Lead with your hips as you change direction. Keep your shoulders in line with your hips as you move. · Set down your load carefully, squatting with your knees and hips only. Exercise and stretch your back   · Do some exercise on most days of the week, if your doctor says it is okay. You can walk, run, swim, or cycle. · Stretch your back muscles. Here are a few exercises to try:  ? Lie on your back, and gently pull one bent knee to your chest. Put that foot back on the floor, and then pull the other knee to your chest.  ? Do pelvic tilts. Lie on your back with your knees bent. Tighten your stomach muscles. Pull your belly button (navel) in and up toward your ribs. You should feel like your back is pressing to the floor and your hips and pelvis are slightly lifting off the floor. Hold for 6 seconds while breathing smoothly. ? Sit with your back flat against a wall. · Keep your core muscles strong. The muscles of your back, belly (abdomen), and buttocks support your spine. ? Pull in your belly and imagine pulling your navel toward your spine. Hold this for 6 seconds, then relax.  Remember to keep breathing normally as you tense your muscles. ? Do curl-ups. Always do them with your knees bent. Keep your low back on the floor, and curl your shoulders toward your knees using a smooth, slow motion. Keep your arms folded across your chest. If this bothers your neck, try putting your hands behind your neck (not your head), with your elbows spread apart. ? Lie on your back with your knees bent and your feet flat on the floor. Tighten your belly muscles, and then push with your feet and raise your buttocks up a few inches. Hold this position 6 seconds as you continue to breathe normally, then lower yourself slowly to the floor. Repeat 8 to 12 times. ? If you like group exercise, try Pilates or yoga. These classes have poses that strengthen the core muscles. Lead a healthy lifestyle   · Stay at a healthy weight to avoid strain on your back. · Do not smoke. Smoking increases the risk of osteoporosis, which weakens the spine. If you need help quitting, talk to your doctor about stop-smoking programs and medicines. These can increase your chances of quitting for good. Where can you learn more? Go to http://karley-estela.info/  Enter L315 in the search box to learn more about \"Learning About How to Have a Healthy Back. \"  Current as of: March 2, 2020               Content Version: 12.6  © 0311-3754 [employer]. Care instructions adapted under license by [employer] (which disclaims liability or warranty for this information). If you have questions about a medical condition or this instruction, always ask your healthcare professional. [place] [address] any warranty or liability for your use of this information. Back Strain: Care Instructions  Overview     A back strain happens when you overstretch, or pull, a muscle in your back. You may hurt your back in an accident or when you exercise or lift something.  Sometimes you may not know how you hurt your back. Most back pain will get better with rest and time. You can take care of yourself at home to help your back heal.  Follow-up care is a key part of your treatment and safety. Be sure to make and go to all appointments, and call your doctor if you are having problems. It's also a good idea to know your test results and keep a list of the medicines you take. How can you care for yourself at home? · Try to stay as active as you can, but stop or reduce any activity that causes pain. · Put ice or a cold pack on the sore muscle for 10 to 20 minutes at a time to stop swelling. Try this every 1 to 2 hours for 3 days (when you are awake) or until the swelling goes down. Put a thin cloth between the ice pack and your skin. · After 2 or 3 days, apply a heating pad on low or a warm cloth to your back. Some doctors suggest that you go back and forth between hot and cold treatments. · Take pain medicines exactly as directed. ? If the doctor gave you a prescription medicine for pain, take it as prescribed. ? If you are not taking a prescription pain medicine, ask your doctor if you can take an over-the-counter medicine. · Try sleeping on your side with a pillow between your legs. Or put a pillow under your knees when you lie on your back. These measures can ease pain in your lower back. · Return to your usual level of activity slowly. When should you call for help? Call 911 anytime you think you may need emergency care. For example, call if:    · You are unable to move a leg at all. Call your doctor now or seek immediate medical care if:    · You have new or worse symptoms in your legs, belly, or buttocks. Symptoms may include:  ? Numbness or tingling. ? Weakness. ? Pain.     · You lose bladder or bowel control. Watch closely for changes in your health, and be sure to contact your doctor if:    · You have a fever, lose weight, or don't feel well.     · You are not getting better as expected. Where can you learn more? Go to http://www.gray.com/  Enter Z044 in the search box to learn more about \"Back Strain: Care Instructions. \"  Current as of: March 2, 2020               Content Version: 12.6  © 5965-6171 [employer]. Care instructions adapted under license by [employer] (which disclaims liability or warranty for this information). If you have questions about a medical condition or this instruction, always ask your healthcare professional. [address] any warranty or liability for your use of this information.

## 2020-10-01 ENCOUNTER — TELEPHONE (OUTPATIENT)
Dept: INTERNAL MEDICINE CLINIC | Age: 25
End: 2020-10-01

## 2020-10-01 DIAGNOSIS — M54.9 BACK PAIN, UNSPECIFIED BACK LOCATION, UNSPECIFIED BACK PAIN LATERALITY, UNSPECIFIED CHRONICITY: Primary | ICD-10-CM

## 2020-10-01 NOTE — TELEPHONE ENCOUNTER
[facility]               Caller's first and last name: pt   Reason for call: Interested in referral to Orthopedic doctor for back   Callback required yes/no and why: yes   Best contact number(s): [phone]   Details to clarify the request: n/a     Copy/Paste  ENVERA

## 2020-10-02 NOTE — TELEPHONE ENCOUNTER
See pended order for ortho- pt has been given the # to Dr Shaila Abbott; pt states that she was seen in the Emergency Room for her back and also recommended physical therapy.

## 2020-11-24 ENCOUNTER — TELEPHONE (OUTPATIENT)
Dept: INTERNAL MEDICINE CLINIC | Age: 25
End: 2020-11-24

## 2020-11-24 NOTE — TELEPHONE ENCOUNTER
----- Message from Royal Juarez sent at 11/24/2020  3:08 PM EST -----  Regarding: Dr Waqas Sun Telephone  Caller's first and last name: N/A  Reason for call: Needing order for lab work sent to [facility] required yes/no and why: Yes. Verify if needing to schedule visit or order can be sent to [facility] [address] contact number(s): [phone]  Details to clarify the request: Pt requesting lab order for hair falling out recently to be sent to [employer]. for testing. Pt advising she is not sure if needing appt. prior to labs or if just lab draw is best action, please advise of next steps.        Message from [facility]

## 2020-11-24 NOTE — TELEPHONE ENCOUNTER
Identified patient 2 identifiers verified. Patient accepted an appointment on 11/27/20 with Dr. Abdi Jimenez.

## 2020-11-27 ENCOUNTER — TELEPHONE (OUTPATIENT)
Dept: INTERNAL MEDICINE CLINIC | Age: 25
End: 2020-11-27

## 2020-11-27 ENCOUNTER — VIRTUAL VISIT (OUTPATIENT)
Dept: INTERNAL MEDICINE CLINIC | Age: 25
End: 2020-11-27
Payer: COMMERCIAL

## 2020-11-27 DIAGNOSIS — L65.9 ALOPECIA: Primary | ICD-10-CM

## 2020-11-27 PROCEDURE — 99213 OFFICE O/P EST LOW 20 MIN: CPT | Performed by: INTERNAL MEDICINE

## 2020-11-27 RX ORDER — NORETHINDRONE ACETATE AND ETHINYL ESTRADIOL, ETHINYL ESTRADIOL AND FERROUS FUMARATE 1MG-10(24)
KIT ORAL
COMMUNITY
End: 2022-10-13

## 2020-11-27 NOTE — PROGRESS NOTES
Lokesh Ware is a 21 y.o. female who was seen by synchronous (real-time) audio-video technology on 11/27/2020 for Hair/Scalp Problem (pt concerned that her hair is falling out )        Progress Note     SUBJECTIVE  Ms. Lokesh Ware presents today acutely for     Chief Complaint   Patient presents with    Hair/Scalp Problem     pt concerned that her hair is falling out      She has been noticing for a few weeks a diffuse hair loss. Not under stress/anxiety. No new products, other than \"a new shampoo, but I've used it before with no problem. \"     She had a similar bout of hair loss in 2016. OBJECTIVE  There were no vitals taken for this visit. Gen: Pleasant 21 y.o.  female in NAD.          ASSESSMENT / PLAN    ICD-10-CM ICD-9-CM    1. Alopecia  X26.5 249.71 METABOLIC PANEL, COMPREHENSIVE      CBC WITH AUTOMATED DIFF      TSH 3RD GENERATION      KATHYA BY MULTIPLEX FLOW IA, QL      SED RATE (ESR)      T4, FREE      DHEA SULFATE      TESTOSTERONE, TOTAL, FEMALE/CHILD      PROLACTIN      REFERRAL TO DERMATOLOGY       Follow-up and Dispositions    · Return if symptoms worsen or fail to improve. Objective:   No flowsheet data found.      [INSTRUCTIONS:  \"[x]\" Indicates a positive item  \"[]\" Indicates a negative item  -- DELETE ALL ITEMS NOT EXAMINED]    Constitutional: [x] Appears well-developed and well-nourished [x] No apparent distress      [] Abnormal -     Mental status: [x] Alert and awake  [x] Oriented to person/place/time [x] Able to follow commands    [] Abnormal -     Eyes:   EOM    [x]  Normal    [] Abnormal -   Sclera  [x]  Normal    [] Abnormal -          Discharge [x]  None visible   [] Abnormal -     HENT: [x] Normocephalic, atraumatic  [] Abnormal -   [x] Mouth/Throat: Mucous membranes are moist    External Ears [x] Normal  [] Abnormal -    Neck: [x] No visualized mass [] Abnormal -     Pulmonary/Chest: [x] Respiratory effort normal   [x] No visualized signs of difficulty breathing or respiratory distress        [] Abnormal -      Musculoskeletal:   [x] Normal gait with no signs of ataxia         [x] Normal range of motion of neck        [] Abnormal -     Neurological:        [x] No Facial Asymmetry (Cranial nerve 7 motor function) (limited exam due to video visit)          [x] No gaze palsy        [] Abnormal -          Skin:        [x] No significant exanthematous lesions or discoloration noted on facial skin         [] Abnormal -            Psychiatric:       [x] Normal Affect [] Abnormal -       Other pertinent observable physical exam findings:-        We discussed the expected course, resolution and complications of the diagnosis(es) in detail. Medication risks, benefits, costs, interactions, and alternatives were discussed as indicated. I advised her to contact the office if her condition worsens, changes or fails to improve as anticipated. She expressed understanding with the diagnosis(es) and plan. Kati Leiva, who was evaluated through a patient-initiated, synchronous (real-time) audio-video encounter, and/or her healthcare decision maker, is aware that it is a billable service, with coverage as determined by her insurance carrier. She provided verbal consent to proceed: YES, and patient identification was verified. It was conducted pursuant to the emergency declaration under the [facility], [address] [place] [address] authority and the [employer] General Act. A caregiver was present when appropriate. Ability to conduct physical exam was limited. I was in office. The patient was at home or otherwise outside the office.       Patti Gale MD

## 2020-12-04 LAB
ALBUMIN SERPL-MCNC: 4.1 G/DL (ref 3.9–5)
ALBUMIN/GLOB SERPL: 1.7 {RATIO} (ref 1.2–2.2)
ALP SERPL-CCNC: 73 IU/L (ref 39–117)
ALT SERPL-CCNC: 9 IU/L (ref 0–32)
ANA SER QL: NEGATIVE
AST SERPL-CCNC: 18 IU/L (ref 0–40)
BASOPHILS # BLD AUTO: 0.1 X10E3/UL (ref 0–0.2)
BASOPHILS NFR BLD AUTO: 1 %
BILIRUB SERPL-MCNC: 0.4 MG/DL (ref 0–1.2)
BUN SERPL-MCNC: 10 MG/DL (ref 6–20)
BUN/CREAT SERPL: 13 (ref 9–23)
CALCIUM SERPL-MCNC: 9.1 MG/DL (ref 8.7–10.2)
CHLORIDE SERPL-SCNC: 101 MMOL/L (ref 96–106)
CO2 SERPL-SCNC: 23 MMOL/L (ref 20–29)
CREAT SERPL-MCNC: 0.76 MG/DL (ref 0.57–1)
DHEA-S SERPL-MCNC: 95.5 UG/DL (ref 84.8–378)
EOSINOPHIL # BLD AUTO: 0.1 X10E3/UL (ref 0–0.4)
EOSINOPHIL NFR BLD AUTO: 1 %
ERYTHROCYTE [DISTWIDTH] IN BLOOD BY AUTOMATED COUNT: 12.2 % (ref 11.7–15.4)
ERYTHROCYTE [SEDIMENTATION RATE] IN BLOOD BY WESTERGREN METHOD: 5 MM/HR (ref 0–32)
GLOBULIN SER CALC-MCNC: 2.4 G/DL (ref 1.5–4.5)
GLUCOSE SERPL-MCNC: 84 MG/DL (ref 65–99)
HCT VFR BLD AUTO: 38.9 % (ref 34–46.6)
HGB BLD-MCNC: 12.9 G/DL (ref 11.1–15.9)
IMM GRANULOCYTES # BLD AUTO: 0 X10E3/UL (ref 0–0.1)
IMM GRANULOCYTES NFR BLD AUTO: 0 %
LYMPHOCYTES # BLD AUTO: 2.7 X10E3/UL (ref 0.7–3.1)
LYMPHOCYTES NFR BLD AUTO: 30 %
MCH RBC QN AUTO: 31.1 PG (ref 26.6–33)
MCHC RBC AUTO-ENTMCNC: 33.2 G/DL (ref 31.5–35.7)
MCV RBC AUTO: 94 FL (ref 79–97)
MONOCYTES # BLD AUTO: 0.6 X10E3/UL (ref 0.1–0.9)
MONOCYTES NFR BLD AUTO: 7 %
NEUTROPHILS # BLD AUTO: 5.4 X10E3/UL (ref 1.4–7)
NEUTROPHILS NFR BLD AUTO: 61 %
PLATELET # BLD AUTO: 342 X10E3/UL (ref 150–450)
POTASSIUM SERPL-SCNC: 5.4 MMOL/L (ref 3.5–5.2)
PROLACTIN SERPL-MCNC: 10.8 NG/ML (ref 4.8–23.3)
PROT SERPL-MCNC: 6.5 G/DL (ref 6–8.5)
RBC # BLD AUTO: 4.15 X10E6/UL (ref 3.77–5.28)
SODIUM SERPL-SCNC: 136 MMOL/L (ref 134–144)
T4 FREE SERPL-MCNC: 0.95 NG/DL (ref 0.82–1.77)
TESTOST SERPL-MCNC: 25.9 NG/DL (ref 10–55)
TSH SERPL DL<=0.005 MIU/L-ACNC: 2.25 UIU/ML (ref 0.45–4.5)
WBC # BLD AUTO: 8.8 X10E3/UL (ref 3.4–10.8)

## 2020-12-08 ENCOUNTER — TELEPHONE (OUTPATIENT)
Dept: INTERNAL MEDICINE CLINIC | Age: 25
End: 2020-12-08

## 2020-12-08 NOTE — TELEPHONE ENCOUNTER
first and last name:   Reason for call: Lab results   Callback required yes/no and why: yes   Best contact number(s): [phone]   Details to clarify the request: Patient is requesting a call back regarding her blood work results

## 2020-12-08 NOTE — TELEPHONE ENCOUNTER
Identified patient 2 identifiers verified. Patient made aware that she will be notified when Dr. Jody Concepcion reviews her labs. No further questions offered at this time.

## 2020-12-09 NOTE — PROGRESS NOTES
Please call the patient and let the patient know that her test result(s) is/are normal.  Thanks. Jose Enrique Pérez.

## 2021-04-01 ENCOUNTER — TELEPHONE (OUTPATIENT)
Dept: INTERNAL MEDICINE CLINIC | Age: 26
End: 2021-04-01

## 2021-04-01 RX ORDER — ESCITALOPRAM OXALATE 10 MG/1
30 TABLET ORAL DAILY
Qty: 30 TAB | Refills: 3 | Status: CANCELLED | OUTPATIENT
Start: 2021-04-01 | End: 2021-05-01

## 2021-04-01 NOTE — TELEPHONE ENCOUNTER
----- Message from Valencia Pointer sent at 4/1/2021  9:51 AM EDT -----  Regarding: Dr. Va Cochran  General Message/Vendor Calls    Caller's first and last name: Pt       Reason for call: Wants to know if Dr. Miladys Camara can fill her Rx Lexapro 20 mg. Callback required yes/no and why: yes, to discuss       Best contact number(s): [phone]      Details to clarify the request: Patient's psychiatrist retired and she's been taking the Rx for 10 years.        Valencia Pointer  Copy/paste [employer]

## 2021-04-06 NOTE — TELEPHONE ENCOUNTER
I returned call to pt, 2 pt identifiers verified: pt states that she takes-    lexapro 10mg once a day & lexapro 20mg once per day

## 2021-04-08 RX ORDER — ESCITALOPRAM OXALATE 10 MG/1
30 TABLET ORAL DAILY
Qty: 90 TAB | Refills: 0 | Status: SHIPPED | OUTPATIENT
Start: 2021-04-08 | End: 2021-11-08 | Stop reason: SDUPTHER

## 2021-04-13 LAB — PAP SMEAR, EXTERNAL: NORMAL

## 2021-10-19 RX ORDER — BUSPIRONE HYDROCHLORIDE 15 MG/1
TABLET ORAL
Qty: 60 TABLET | Refills: 11 | Status: SHIPPED | OUTPATIENT
Start: 2021-10-19 | End: 2022-04-19

## 2021-11-08 DIAGNOSIS — F32.A ANXIETY AND DEPRESSION: Primary | ICD-10-CM

## 2021-11-08 DIAGNOSIS — F41.9 ANXIETY AND DEPRESSION: Primary | ICD-10-CM

## 2021-11-08 RX ORDER — ESCITALOPRAM OXALATE 10 MG/1
30 TABLET ORAL DAILY
Qty: 90 TABLET | Refills: 0 | Status: SHIPPED | OUTPATIENT
Start: 2021-11-08 | End: 2021-11-11 | Stop reason: SDUPTHER

## 2021-11-08 NOTE — TELEPHONE ENCOUNTER
PCP: Connie De Souza MD    Last appt: 11/27/2020  No future appointments. Requested Prescriptions     Pending Prescriptions Disp Refills    escitalopram oxalate (LEXAPRO) 10 mg tablet 90 Tablet 0     Sig: Take 3 Tablets by mouth daily.

## 2022-04-19 RX ORDER — BUSPIRONE HYDROCHLORIDE 15 MG/1
TABLET ORAL
Qty: 60 TABLET | Refills: 11 | Status: SHIPPED | OUTPATIENT
Start: 2022-04-19

## 2022-05-25 LAB
ANTIBODY SCREEN, EXTERNAL: NEGATIVE
CHLAMYDIA, EXTERNAL: NEGATIVE
HBSAG, EXTERNAL: NEGATIVE
HCT, EXTERNAL: 36.6
HEPATITIS C AB,   EXT: NEGATIVE
HGB, EXTERNAL: 12.5
HIV, EXTERNAL: NORMAL
N. GONORRHEA, EXTERNAL: NEGATIVE
PLATELET CNT,   EXTERNAL: 352
RUBELLA, EXTERNAL: NORMAL
T. PALLIDUM, EXTERNAL: NORMAL
TYPE, ABO & RH, EXTERNAL: NORMAL
URINALYSIS, EXTERNAL: NORMAL

## 2022-05-31 RX ORDER — ESCITALOPRAM OXALATE 10 MG/1
10 TABLET ORAL DAILY
Qty: 90 TABLET | Refills: 3 | Status: SHIPPED | OUTPATIENT
Start: 2022-05-31

## 2022-06-03 LAB — NIPT, EXTERNAL: NORMAL

## 2022-07-22 LAB — AFP, MATERNAL, EXTERNAL: NEGATIVE

## 2022-07-29 LAB
CHLAMYDIA, EXTERNAL: NEGATIVE
N. GONORRHEA, EXTERNAL: NEGATIVE
T. PALLIDUM, EXTERNAL: NEGATIVE

## 2022-09-12 RX ORDER — ESCITALOPRAM OXALATE 20 MG/1
TABLET ORAL
Qty: 90 TABLET | Refills: 3 | Status: SHIPPED | OUTPATIENT
Start: 2022-09-12

## 2022-09-14 ENCOUNTER — HOSPITAL ENCOUNTER (EMERGENCY)
Age: 27
Discharge: HOME | End: 2022-09-14
Payer: COMMERCIAL

## 2022-09-14 VITALS — SYSTOLIC BLOOD PRESSURE: 98 MMHG | TEMPERATURE: 98.6 F | DIASTOLIC BLOOD PRESSURE: 82 MMHG

## 2022-09-14 PROCEDURE — 99283 EMERGENCY DEPT VISIT LOW MDM: CPT

## 2022-09-14 RX ORDER — GUAIFENESIN 100 MG/5ML
81 LIQUID (ML) ORAL DAILY
COMMUNITY

## 2022-09-14 NOTE — ED PROVIDER NOTES
32 yo  @ 24w5d who presents with complaint of vaginal bleeding after intercourse. This is the second major episode of bleeding after sex. The first time was much more than this and it was in [facility] while they were on vacation. Anxiety - on buspirone and escitalopram  H/o MRSA  COVID in pregnancy  Genital herpes - on suppression  Ruebella non-immune    The history is provided by the patient. Vaginal Bleeding  The current episode started less than 1 hour ago.         Chief Complaint   Patient presents with    Vaginal Bleeding       Past Medical History:   Diagnosis Date    Depression     Endometriosis     Genital herpes     Interstitial cystitis     Leukocytosis 10/11/2013    Other ill-defined conditions(799.89)     mrsa    Psychiatric disorder     OCD anxiety    Psychotic disorder ([facility] [address]     Right upper quadrant abdominal pain 10/11/2013    Sepsis ([facility] [address]        Past Surgical History:   Procedure Laterality Date    HX GYN      ENDOMETRIOUS    HX OTHER SURGICAL  2017    endometriosis    HX TONSIL AND ADENOIDECTOMY      HX WISDOM TEETH EXTRACTION      SC ABDOMEN SURGERY PROC UNLISTED      polyp removed from intestines at age 1         Family History:   Problem Relation Age of Onset    Cancer Mother         Breast       Social History     Socioeconomic History    Marital status: SINGLE     Spouse name: Not on file    Number of children: Not on file    Years of education: Not on file    Highest education level: Not on file   Occupational History    Not on file   Tobacco Use    Smoking status: Never    Smokeless tobacco: Never   Substance and Sexual Activity    Alcohol use: Yes     Comment: rarely    Drug use: No    Sexual activity: Not on file   Other Topics Concern    Not on file   Social History Narrative    Not on file     Social Determinants of Health     Financial Resource Strain: Not on file   Food Insecurity: Not on file   Transportation Needs: Not on file   Physical Activity: Not on file   Stress: Not on file Social Connections: Not on file   Intimate Partner Violence: Not on file   Housing Stability: Not on file         ALLERGIES: Patient has no known allergies. Review of Systems   Genitourinary:  Positive for vaginal bleeding. All other systems reviewed and are negative. There were no vitals filed for this visit. Physical Exam  Vitals and nursing note reviewed. Exam conducted with a chaperone present. Constitutional:       General: She is not in acute distress. Appearance: Normal appearance. She is not ill-appearing. Cardiovascular:      Rate and Rhythm: Normal rate and regular rhythm. Pulmonary:      Effort: Pulmonary effort is normal.   Abdominal:      Palpations: Abdomen is soft. Genitourinary:     Comments: On sterile spec exam there were 2 large q-tips worth of dark red blood but no active bleeding. Cervix visually and digitally closed, cervix doesn't appear friable  Neurological:      Mental Status: She is alert.         FHTs 150s, moderate variabilty, (+) accels, no decels, one ctx    MDM  Risk of Complications, Morbidity, and/or Mortality  Presenting problems: high  Diagnostic procedures: high  Management options: high        32 yo G1:0 @ 24w5d with vaginal bleeding after intercourse  No active bleeding now  [id number] [address] Vera to d/c home  Advised to refrain from sex  She and chris are comfortable with plan

## 2022-09-14 NOTE — ED TRIAGE NOTES
1839 Patient arrived for vaginal bleeding episode that occurred 30 minutes ago. Previously experienced this several weeks ago while in [facility]. KELLE myrick, MD notified patient here and ready to be seen. 1904 Dr Tom Suarez at bedside. Speculum exam performed. Cervix closed. Will discharge home.

## 2022-09-14 NOTE — DISCHARGE INSTRUCTIONS
Vaginal Bleeding After the First Trimester of Pregnancy: After Your Visit  Your Care Instructions  Several things can cause bleeding in the second or third trimester of pregnancy. Some of them are serious, but others are not a cause for worry. In some cases, the cause of bleeding is not known. You do not have one of the serious conditions that can cause bleeding in pregnancy. Your baby is fine. However, you need to contact your doctor about any future bleeding, which could be a sign of  labor or other problems. Follow-up care is a key part of your treatment and safety. Be sure to make and go to all appointments, and call your doctor if you are having problems. Its also a good idea to know your test results and keep a list of the medicines you take. How can you care for yourself at home? Do not have sex until your doctor says it is safe. Follow your doctor's instructions about how much activity is safe for you. When should you call for help? Call 911 anytime you think you may need emergency care. For example, call if:  You passed out (lost consciousness). You have severe vaginal bleeding. You have severe pain in your belly or pelvis. You have had fluid gushing or leaking from your vagina and you know or think the umbilical cord is bulging into your vagina. If this happens, immediately get down on your knees so your rear end (buttocks) is higher than your head. This will decrease the pressure on the cord until help arrives. Call your doctor now or seek immediate medical care if:  You have signs of preeclampsia, such as:  Sudden swelling of your face, hands, or feet. New vision problems (such as dimness or blurring). A severe headache. You have any vaginal bleeding. You have belly pain or cramping. You have a fever. You have had regular contractions (with or without pain) for an hour.  This means that you have 8 or more within 1 hour or 4 or more in 20 minutes after you change your position and drink fluids. You have a sudden release of fluid from the vagina. You have low back pain or pelvic pressure that does not go away. You notice that your baby has stopped moving or is moving much less than normal.  Watch closely for changes in your health, and be sure to contact your doctor if:  You do not get better as expected. Where can you learn more? Go to [employer]  Enter I318 in the search box to learn more about \"Vaginal Bleeding After the First Trimester of Pregnancy: After Your Visit. \"   © 1023-4770 Healthwise, Incorporated. Care instructions adapted under license by [employer] (which disclaims liability or warranty for this information). This care instruction is for use with your licensed healthcare professional. If you have questions about a medical condition or this instruction, always ask your healthcare professional. [address] any warranty or liability for your use of this information. Content Version: 9.4.12641; Last Revised: March 24, 2011          Weeks 22 to 26 of Your Pregnancy: Care Instructions  Overview     As you enter your 7th month of pregnancy at week 26, your baby's lungs are growing stronger and getting ready to breathe. You may notice that your baby responds to the sound of your voice. You may also notice that your baby does less turning and twisting and more squirming, kicking, or jerking. Jerking often means that your baby has hiccups. Hiccups are normal and are only temporary. You may want to think about attending a childbirth preparation class. This is also a good time to start thinking about whether you want to have pain medicine during labor. You may be tested for gestational diabetes between weeks 25 and 28. Gestational diabetes occurs when your blood sugar level gets too high when you're pregnant. The test is important, because you can have gestational diabetes and not know it.  But the condition can cause problems for your baby. Follow-up care is a key part of your treatment and safety. Be sure to make and go to all appointments, and call your doctor if you are having problems. It's also a good idea to know your test results and keep a list of the medicines you take. How can you care for yourself at home? Ease discomfort from your baby's kicking  Change your position. Sometimes this will cause your baby to change position too. Take a deep breath while you raise your arm over your head. Then breathe out while you drop your arm. Do Kegel exercises to prevent urine from leaking  You can do Kegel exercises while you stand or sit. Squeeze the same muscles you would use to stop your urine. Your belly and thighs should not move. Hold the squeeze for 3 seconds, and then relax for 3 seconds. Start with 3 seconds. Then add 1 second each week until you are able to squeeze for 10 seconds. Repeat the exercise 10 to 15 times for each session. Do three or more sessions each day. Ease or reduce swelling in your feet, ankles, hands, and fingers  If your fingers are puffy, take off your rings. Do not eat high-salt foods, such as potato chips. Prop up your feet on a stool or couch as much as possible. Sleep with pillows under your feet. Do not stand for long periods of time or wear tight shoes. Wear support stockings. Where can you learn more? Go to http://www.gregg.com/  Enter G264 in the search box to learn more about \"Weeks 22 to 26 of Your Pregnancy: Care Instructions. \"  Current as of: June 16, 2021               Content Version: 13.2  © 7963-5166 Healthwise, Incorporated. Care instructions adapted under license by [employer] (which disclaims liability or warranty for this information).  If you have questions about a medical condition or this instruction, always ask your healthcare professional. [place] [address] any warranty or liability for your use of this information. Pregnancy Precautions: Care Instructions  Your Care Instructions     There is no sure way to prevent labor before your due date ( labor) or to prevent most other pregnancy problems. But there are things you can do to increase your chances of a healthy pregnancy. Go to your appointments, follow your doctor's advice, and take good care of yourself. Eat well, and exercise (if your doctor agrees). And make sure to drink plenty of water. Follow-up care is a key part of your treatment and safety. Be sure to make and go to all appointments, and call your doctor if you are having problems. It's also a good idea to know your test results and keep a list of the medicines you take. How can you care for yourself at home? Make sure you go to your prenatal appointments. At each visit, your doctor will check your blood pressure. Your doctor will also check to see if you have protein in your urine. High blood pressure and protein in urine are signs of preeclampsia. This condition can be dangerous for you and your baby. Drink plenty of fluids. Dehydration can cause contractions. If you have kidney, heart, or liver disease and have to limit fluids, talk with your doctor before you increase the amount of fluids you drink. Tell your doctor right away if you notice any symptoms of an infection, such as:  Burning when you urinate. A foul-smelling discharge from your vagina. Vaginal itching. Unexplained fever. Unusual pain or soreness in your uterus or lower belly. Eat a balanced diet. Include plenty of foods that are high in calcium and iron. Foods high in calcium include milk, cheese, yogurt, almonds, and broccoli. Foods high in iron include red meat, shellfish, poultry, eggs, beans, raisins, whole-grain bread, and leafy green vegetables. Do not smoke. If you need help quitting, talk to your doctor about stop-smoking programs and medicines. These can increase your chances of quitting for good.   Do not drink alcohol or use marijuana or illegal drugs. Follow your doctor's directions about activity. Your doctor will let you know how much, if any, exercise you can do. Ask your doctor if you can have sex. If you are at risk for early labor, your doctor may ask you to not have sex. Take care to prevent falls. During pregnancy, your joints are loose, and your balance is off. Sports such as bicycling, skiing, or in-line skating can increase your risk of falling. And don't ride horses or motorcycles, dive, water ski, scuba dive, or parachute jump while you are pregnant. Avoid things that can make your body too hot and may be harmful to your baby, such as a hot tub or sauna. Or talk with your doctor before doing anything that raises your body temperature. Your doctor can tell you if it's safe. Do not take any over-the-counter or herbal medicines or supplements without talking to your doctor or pharmacist first.  When should you call for help? Call 911  anytime you think you may need emergency care. For example, call if:    You passed out (lost consciousness). You have a seizure. You have severe vaginal bleeding. You have severe pain in your belly or pelvis. You have had fluid gushing or leaking from your vagina and you know or think the umbilical cord is bulging into your vagina. If this happens, immediately get down on your knees so your rear end (buttocks) is higher than your head. This will decrease the pressure on the cord until help arrives. Call your doctor now or seek immediate medical care if:    You have signs of preeclampsia, such as:  Sudden swelling of your face, hands, or feet. New vision problems (such as dimness, blurring, or seeing spots). A severe headache. You have any vaginal bleeding. You have belly pain or cramping. You have a fever. You have had regular contractions (with or without pain) for an hour.  This means that you have 8 or more within 1 hour or 4 or more in 20 minutes after you change your position and drink fluids. You have a sudden release of fluid from your vagina. You have low back pain or pelvic pressure that does not go away. You notice that your baby has stopped moving or is moving much less than normal.   Watch closely for changes in your health, and be sure to contact your doctor if you have any problems. Where can you learn more? Go to http://www.gregg.com/  Enter Y951 in the search box to learn more about \"Pregnancy Precautions: Care Instructions. \"  Current as of: June 16, 2021               Content Version: 13.2  © 1263-3150 "[employer]. Care instructions adapted under license by [employer] (which disclaims liability or warranty for this information). If you have questions about a medical condition or this instruction, always ask your healthcare professional. [address] any warranty or liability for your use of this information.

## 2022-09-15 NOTE — PROGRESS NOTES
2004 Patient discharged home, undelivered. Discharge instructions reviewed with patient and significant other. Patient given copy of d/c instructions to take home, signature obtained on signature pad. No further questions or concerns at this time.

## 2022-10-13 VITALS — HEIGHT: 64 IN | BODY MASS INDEX: 23.69 KG/M2

## 2022-10-13 DIAGNOSIS — Z34.00 ENCOUNTER FOR SUPERVISION PREGNANCY IN PRIMIGRAVIDA, ANTEPARTUM: ICD-10-CM

## 2022-10-18 ENCOUNTER — INITIAL PRENATAL (OUTPATIENT)
Dept: OBGYN CLINIC | Age: 27
End: 2022-10-18
Payer: COMMERCIAL

## 2022-10-18 VITALS — BODY MASS INDEX: 29.01 KG/M2 | SYSTOLIC BLOOD PRESSURE: 130 MMHG | WEIGHT: 169 LBS | DIASTOLIC BLOOD PRESSURE: 84 MMHG

## 2022-10-18 DIAGNOSIS — Z34.90 PREGNANCY, UNSPECIFIED GESTATIONAL AGE: Primary | ICD-10-CM

## 2022-10-18 DIAGNOSIS — Z34.00 ENCOUNTER FOR SUPERVISION PREGNANCY IN PRIMIGRAVIDA, ANTEPARTUM: ICD-10-CM

## 2022-10-18 PROCEDURE — 0502F SUBSEQUENT PRENATAL CARE: CPT | Performed by: OBSTETRICS & GYNECOLOGY

## 2022-10-18 NOTE — PROGRESS NOTES
Obstetrical Transfer Note    Ms. Sony Modi is a ,  31 y.o. female WHITE/NON- No LMP recorded. Patient is pregnant. She is transferring to our practice after receiving OB care at [facility] [place]. Records received and scanned into media. No specialty comments available. EDC dating:  Her due date has been determined by LMP and first trimester US. Her prenatal care up to this point has been notable for episodes of vaginal bleeding with clots present. She has been placed on pelvic rest.    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 Pap/Occupational history:  Last pap smear: last year Results: Normal      Substance history: negative for alcohol, tobacco and street drugs. Positive for nothing. Exposure history: There is/are no indoor cat/s in the home. The patient was instructed to not change the cat litter. She admits close contact with children on a regular basis. She has had chicken pox or the vaccine in the past.   Patient denies issues with domestic violence. Genetic Screening/Teratology Counseling: (Includes patient, baby's father, or anyone in either family with:)  3.  Patient's age >/= 28 at EDC?--no  2. Thalassemia ([facility], [facility], [address], or  background): MCV<80?--no.     3.  Neural tube defect (meningomyelocele, spina bifida, anencephaly)?--no.   4.  Congenital heart defect?--no.  5.  Down syndrome?--no.   6.  Fredrick-Sachs (Confucianist, [place])?--no.   7.  Canavan's Disease?--no.   8.  Familial Dysautonomia?--no.   9.  Sickle cell disease or trait ()? --no   The patient has not been tested for sickle trait  10.   Hemophilia or other blood disorders?--no. 11.  Muscular dystrophy?--no. 12.  Cystic fibrosis?--no. 13.  Ripley's Chorea?--no. 14.  Mental retardation/autism (if yes was person tested for Fragile X)?--no. 15.  Other inherited genetic or chromosomal disorder?--no. 12.  Maternal metabolic disorder (DM, PKU, etc)?--no. 17.  Patient or FOB with a child with a birth defect not listed above?--no.  17a. Patient or FOB with a birth defect themselves?--no. 18.  Recurrent pregnancy loss, or stillbirth?--no. 19.  Any medications since LMP other than prenatal vitamins (include vitamins, supplements, OTC meds, drugs, alcohol)?--no. 20.  Any other genetic/environmental exposure to discuss?--no. Infection History:  1. Lives with someone with TB or TB exposed?--no.   2.  Patient or partner has history of genital herpes?--yes  3. Rash or viral illness since LMP?--no.    4.  History of STD (GC, CT, HPV, syphilis, HIV)? --no   5. Other: OTHER?       Past Medical History:   Diagnosis Date    Depression     Endometriosis     Genital herpes     Interstitial cystitis     Leukocytosis 10/11/2013    Other ill-defined conditions(799.89)     mrsa    Psychiatric disorder     OCD anxiety    Psychotic disorder ([facility] [address]     Right upper quadrant abdominal pain 10/11/2013    Sepsis ([facility] [address]      Past Surgical History:   Procedure Laterality Date    HX GYN      ENDOMETRIOUS    HX OTHER SURGICAL  2017    endometriosis    HX TONSIL AND ADENOIDECTOMY      HX WISDOM TEETH EXTRACTION      IL ABDOMEN SURGERY PROC UNLISTED      polyp removed from intestines at age 1     Social History     Occupational History    Not on file   Tobacco Use    Smoking status: Never    Smokeless tobacco: Never   Substance and Sexual Activity    Alcohol use: Yes     Comment: rarely    Drug use: No    Sexual activity: Not on file     Family History   Problem Relation Age of Onset    Cancer Mother         Breast     OB History    Para Term  AB Living   1             SAB IAB Ectopic Molar Multiple Live Births                    # Outcome Date GA Lbr Joby/2nd Weight Sex Delivery Anes PTL Lv   1 Current              No Known Allergies  Prior to Admission medications    Medication Sig Start Date End Date Taking? Authorizing Provider   aspirin 81 mg chewable tablet Take 81 mg by mouth daily. Yes Provider, Historical   escitalopram oxalate (LEXAPRO) 20 mg tablet TAKE 1 TABLET BY MOUTH EVERY DAY 9/12/22  Yes Sergio Dutta MD   escitalopram oxalate (LEXAPRO) 10 mg tablet TAKE 1 TABLET BY MOUTH DAILY.  SHE TAKES A 20 MG + 10 MG TO EQUAL 30 MG DAILY 5/31/22  Yes Sergio Dutat MD   busPIRone (BUSPAR) 15 mg tablet TAKE 1 TABLET BY MOUTH TWICE A DAY 4/19/22  Yes Sergio Dutta MD   valACYclovir (VALTREX) 1 gram tablet valacyclovir 1 gram tablet   TAKE 1 TABLET BY MOUTH EVERY DAY 2/14/16  Yes Provider, Historical        Review of Systems: History obtained from the patient  Constitutional: negative for weight loss, fever, night sweats  HEENT: negative for hearing loss, earache, congestion, snoring, sore throat  CV: negative for chest pain, palpitations, edema  Resp: negative for cough, shortness of breath, wheezing  Breast: negative for breast lumps, nipple discharge, galactorrhea  GI: negative for change in bowel habits, abdominal pain, black or bloody stools  : negative for frequency, dysuria, hematuria, vaginal discharge  MSK: negative for back pain, joint pain, muscle pain  Skin: negative for itching, rash, hives  Neuro: negative for dizziness, headache, confusion, weakness  Psych: negative for anxiety, depression, change in mood  Heme/lymph: negative for bleeding, bruising, pallor    Objective:  Visit Vitals  /84   Wt 169 lb (76.7 kg)   BMI 29.01 kg/m²       Physical Exam:     Constitutional  Appearance: well-nourished, well developed, alert, in no acute distress    HENT  Head  Face: appears normal  Eyes: appear normal  Ears: normal  Mouth: normal  Lips: no lesions      Chest  Respiratory Effort: breathing unlabored     Cardiovascular  Heart: Auscultation: regular rate and rhythm without murmur      Gastrointestinal  Abdominal Examination: abdomen non-tender to palpation, normal bowel sounds, no masses present; FH 32; FHTs present  Liver and spleen: no hepatomegaly present, spleen not palpable  Hernias: no hernias identified    Genitourinary  deferred    Skin  General Inspection: no rash, no lesions identified    Neurologic/Psychiatric  Mental Status:  Orientation: grossly oriented to person, place and time  Mood and Affect: mood normal, affect appropriate    Assessment:   Intrauterine pregnancy with problems noted as in the problem list.    Plan:   Patient and her significant other have been welcomed to our practice. Call coverage reviewed and welcoming folder reviewed. All questions have been answered. Patient declines presence of chaperone during today's visit. Handouts given to pt.     Beena De Paz MD

## 2022-10-31 ENCOUNTER — TELEPHONE (OUTPATIENT)
Dept: OBGYN CLINIC | Age: 27
End: 2022-10-31

## 2022-10-31 NOTE — TELEPHONE ENCOUNTER
Pt called left voice mail name and  verified. Pt asking because she ate a fiber one bar and noticed it had chickory root extract in it if it was ok to eat. Called pt back left voice mail per andrea bonilla ok for patient to have ingested chicory root extract.

## 2022-10-31 NOTE — TELEPHONE ENCOUNTER
Pt called back pt states she went t bathroom and had light pink blood when she wiped. She was trying to have bowel movement. No cramping and no contraction. s. baby  is moving but she is going to do a more focused kick count for her piece of mind. Pt did throw up twice but at different intervals of the day she is able to keep things down pt given bleeding precautions pt also advised if she is not keeping fluids down for 24 hours to go to er. Pt verbalized understanding.

## 2022-11-08 ENCOUNTER — ROUTINE PRENATAL (OUTPATIENT)
Dept: OBGYN CLINIC | Age: 27
End: 2022-11-08
Payer: COMMERCIAL

## 2022-11-08 VITALS — DIASTOLIC BLOOD PRESSURE: 76 MMHG | SYSTOLIC BLOOD PRESSURE: 116 MMHG | WEIGHT: 173 LBS | BODY MASS INDEX: 29.7 KG/M2

## 2022-11-08 DIAGNOSIS — Z34.90 PREGNANCY, UNSPECIFIED GESTATIONAL AGE: Primary | ICD-10-CM

## 2022-11-08 DIAGNOSIS — Z34.00 ENCOUNTER FOR SUPERVISION PREGNANCY IN PRIMIGRAVIDA, ANTEPARTUM: ICD-10-CM

## 2022-11-08 PROCEDURE — 0502F SUBSEQUENT PRENATAL CARE: CPT | Performed by: OBSTETRICS & GYNECOLOGY

## 2022-11-08 NOTE — PROGRESS NOTES
Doing well overall  Active FM  Occ return of n/v, about once a week. Had 3 days of spotting last week during a BM, now resolved. She is still on pelvic rest.   Relays that both her mom and her sister had c-sections for failure to dilate beyond 6cm. Growth scan at next visit due to prior COVID in pregnancy.

## 2022-11-21 ENCOUNTER — ROUTINE PRENATAL (OUTPATIENT)
Dept: OBGYN CLINIC | Age: 27
End: 2022-11-21

## 2022-11-21 VITALS — WEIGHT: 180 LBS | DIASTOLIC BLOOD PRESSURE: 78 MMHG | BODY MASS INDEX: 30.9 KG/M2 | SYSTOLIC BLOOD PRESSURE: 116 MMHG

## 2022-11-21 DIAGNOSIS — Z34.90 PREGNANCY, UNSPECIFIED GESTATIONAL AGE: Primary | ICD-10-CM

## 2022-11-21 DIAGNOSIS — Z34.00 ENCOUNTER FOR SUPERVISION PREGNANCY IN PRIMIGRAVIDA, ANTEPARTUM: ICD-10-CM

## 2022-11-21 PROCEDURE — 0502F SUBSEQUENT PRENATAL CARE: CPT | Performed by: OBSTETRICS & GYNECOLOGY

## 2022-11-21 NOTE — PROGRESS NOTES
Doing well overall  Active FM  Inc swelling in hands, feet  RATNA today with mild poly (RATNA 28), discussed obtaining repeat RATNA in 2 weeks. GBS at next visit. US today:     LIMITED OB SCAN  A SINGLE VERTEX 35W5D IUP IS SEEN. FETAL CARDIAC MOTION OBSERVED. LIMITED ANATOMY WAS VISUALIZED AND APPEARS WNL. EFW= 5 LB 5 OZ (44.2 %)  RATNA= 28.27 CM  PLACENTA APPEARS WITHIN NORMAL LIMITS.

## 2022-12-06 ENCOUNTER — TELEPHONE (OUTPATIENT)
Dept: OBGYN CLINIC | Age: 27
End: 2022-12-06

## 2022-12-06 ENCOUNTER — HOSPITAL ENCOUNTER (EMERGENCY)
Age: 27
Discharge: HOME | End: 2022-12-06
Payer: COMMERCIAL

## 2022-12-06 VITALS
SYSTOLIC BLOOD PRESSURE: 128 MMHG | DIASTOLIC BLOOD PRESSURE: 81 MMHG | HEART RATE: 79 BPM | OXYGEN SATURATION: 98 % | RESPIRATION RATE: 20 BRPM | TEMPERATURE: 98.5 F

## 2022-12-06 LAB
A1 MICROGLOB PLACENTAL VAG QL: NEGATIVE
CONTROL LINE PRESENT?: NORMAL
EXPIRATION DATE: NORMAL
INTERNAL NEGATIVE CONTROL: NORMAL
KIT LOT NO.: NORMAL

## 2022-12-06 PROCEDURE — 99284 EMERGENCY DEPT VISIT MOD MDM: CPT

## 2022-12-06 PROCEDURE — 84112 EVAL AMNIOTIC FLUID PROTEIN: CPT | Performed by: OBSTETRICS & GYNECOLOGY

## 2022-12-06 NOTE — TELEPHONE ENCOUNTER
Pt called name and  verified. Pt is  36 weeks 4 days gestation pt calling she has lsot 4 pounds was concerned about the weight loss. She has not been sick she states baby is fine . Baby is moving no discharge no contractions.          Please advise thank you

## 2022-12-06 NOTE — ED PROVIDER NOTES
32 yo  @ 36w4d who presents with c/o LOF. She got out of the bathtub and had a small gush of fluid and then had 2 more tiny leaks after that. She reports good fetal movement. She is known to me; I saw her earlier in pregnancy for an episode of bleeding. Primary Provider: Love --> transfer from [facility] @ 29w  G1  EDC by 1st tri US    Pregnancy problems:  Hx MRSA: 1st tri culture neg, 3rd tri culture__  HSV1 in genital region: on daily Valtrex 1g  COVID in pregnancy in first tri: on ldASA, growth scan in third tri 34 weks EFW 44%ile, Ratna 28  Anxiety: Lexapro and Buspar  Rubella non-immune: PP vaccination___  Transfer from [facility] at 29 weeks  Mild polyhydramnios: RATNA 28 at 34 weeks. EFW 5lb5oz c/w 44%ile, FU 36 weeks ___    IOB labs: B positive, normal, UC neg, GC/C neg  Genetic Screening: Materniti 21 normal BOY! Anatomy: BOY! Normal anatomy, fundal/anterior placenta  Flu: done 10/4 @ [facility]  TDAP: done 10/4 @ [facility]  Third Tri Labs: done @ [facility] (117/11. 5)  GBS:    Pain mgmt. in labor: epidural   Feeding: breast - pump ordered  Circ:  Social:  Kar Yepez is brother's of [facility] L&D nurse) - he works in Data analytics for [employer]. Abby Rooney works for an [employer], goes into the office (her father's business)  Birth preferences: no fentanyl      The history is provided by the patient.         Chief Complaint   Patient presents with    Leakage/Loss of Fluid     C/o LOF that occurred at 35, denies ctxs/vb, endorses FM       Past Medical History:   Diagnosis Date    Depression     Endometriosis     Genital herpes     Interstitial cystitis     Leukocytosis 10/11/2013    Other ill-defined conditions(799.89)     mrsa    Psychiatric disorder     OCD anxiety    Psychotic disorder ([address]     Right upper quadrant abdominal pain 10/11/2013    Sepsis [address] [address]        Past Surgical History:   Procedure Laterality Date    HX GYN      ENDOMETRIOUS    HX OTHER SURGICAL  2017    endometriosis    HX TONSIL AND ADENOIDECTOMY      HX WISDOM TEETH EXTRACTION      [facility] ABDOMEN SURGERY PROC UNLISTED      polyp removed from intestines at age 1         Family History:   Problem Relation Age of Onset    Cancer Mother         Breast       Social History     Socioeconomic History    Marital status: SINGLE     Spouse name: Not on file    Number of children: Not on file    Years of education: Not on file    Highest education level: Not on file   Occupational History    Not on file   Tobacco Use    Smoking status: Never    Smokeless tobacco: Never   Substance and Sexual Activity    Alcohol use: Yes     Comment: rarely    Drug use: No    Sexual activity: Not on file   Other Topics Concern    Not on file   Social History Narrative    Not on file     Social Determinants of Health     Financial Resource Strain: Not on file   Food Insecurity: Not on file   Transportation Needs: Not on file   Physical Activity: Not on file   Stress: Not on file   Social Connections: Not on file   Intimate Partner Violence: Not on file   Housing Stability: Not on file         ALLERGIES: Patient has no known allergies. Review of Systems   All other systems reviewed and are negative. Vitals:    12/06/22 1808   BP: 128/81   Pulse: 79   Resp: 20   Temp: 98.5 °F (36.9 °C)   SpO2: 98%            Physical Exam  Vitals and nursing note reviewed. Exam conducted with a chaperone present. Constitutional:       General: She is not in acute distress. Appearance: Normal appearance. She is not ill-appearing. Cardiovascular:      Rate and Rhythm: Normal rate and regular rhythm. Pulmonary:      Effort: Pulmonary effort is normal.   Abdominal:      Palpations: Abdomen is soft. Genitourinary:     Comments: Cervix visually closed  No pooling  Neurological:      Mental Status: She is alert.       Negative nitrazine and ferning    FHTs reactive, mod variability, baseline 130s, (+) accels, no ctxs    MDM     Amount and/or Complexity of Data Reviewed  Clinical lab tests: ordered  Review and summarize past medical records: yes    Risk of Complications, Morbidity, and/or Mortality  Presenting problems: moderate  Diagnostic procedures: moderate  Management options: moderate              ED Course as of 227   Tue Dec 06, 2022   1836 32 yo  @ 36w4d presents for r/o ROM. Negative exam and testing for ROM. Ok to discharge home. Precautions for return reviewed.  [NR]      ED Course User Index  [NR] Lianne Pereira MD       Procedures    @[facility]@

## 2022-12-06 NOTE — PROGRESS NOTES
1757: Patient presented to [facility] with chief complaint of LOF that occurred this afternoon at 1630. She reports getting out of the bathtub and feeling a gush of fluid when drying off, followed by a second smaller gush of fluid. She reports the fluid was clear. Denies contractions, denies VB, and endorses VB. She reports nausea/vomiting, and vomited twice on her way here. Nitrazine negative. 1818: Dr. Kathy Stewart notified about patient. 1826: Amnisure negative. 1832: Speculum exam performed-- no pooling noted. Cleared for discharge.

## 2022-12-07 NOTE — TELEPHONE ENCOUNTER
Called patient verified name and  pt advised per below    Princess Stoll MD  to Me    RL    4:04 PM  If she is eating well and staying hydrated, then that is reassuring.   She also has an ultrasound scheduled for Friday, so that will give us a chance to reassess baby as well    Pt verbalized understanding

## 2022-12-09 ENCOUNTER — ROUTINE PRENATAL (OUTPATIENT)
Dept: OBGYN CLINIC | Age: 27
End: 2022-12-09

## 2022-12-09 VITALS — DIASTOLIC BLOOD PRESSURE: 82 MMHG | SYSTOLIC BLOOD PRESSURE: 118 MMHG | WEIGHT: 182 LBS | BODY MASS INDEX: 31.24 KG/M2

## 2022-12-09 DIAGNOSIS — Z34.90 PREGNANCY, UNSPECIFIED GESTATIONAL AGE: Primary | ICD-10-CM

## 2022-12-09 DIAGNOSIS — Z34.00 ENCOUNTER FOR SUPERVISION PREGNANCY IN PRIMIGRAVIDA, ANTEPARTUM: ICD-10-CM

## 2022-12-09 NOTE — PROGRESS NOTES
Doing well overall  Active FM  Wearing support belt for back pain  Went to [facility] on 12/6 for rule out ROM  GBS today  Cervix checked today, tight 1cm and had moderate bright red bleeding afterwards. Prec discussed. Refer to [facility] for FU ultrasound given polyhydramnios persists and the recurrent bleeding she has had this pregnancy. US today:    LIMITED OB SCAN  A SINGLE VERTEX 37W0D IUP IS SEEN. FETAL CARDIAC MOTION OBSERVED. LIMITED ANATOMY WAS VISUALIZED AND APPEARS WNL. BPP= 8/8  RATNA= 30.7 CM  PLACENTA APPEARS WITHIN NORMAL LIMITS.

## 2022-12-11 LAB — GP B STREP DNA SPEC QL NAA+PROBE: POSITIVE

## 2022-12-16 ENCOUNTER — HOSPITAL ENCOUNTER (OUTPATIENT)
Dept: PERINATAL CARE | Age: 27
Discharge: HOME | End: 2022-12-16
Attending: OBSTETRICS & GYNECOLOGY
Payer: COMMERCIAL

## 2022-12-16 ENCOUNTER — ROUTINE PRENATAL (OUTPATIENT)
Dept: OBGYN CLINIC | Age: 27
End: 2022-12-16
Payer: COMMERCIAL

## 2022-12-16 VITALS — SYSTOLIC BLOOD PRESSURE: 124 MMHG | WEIGHT: 187 LBS | BODY MASS INDEX: 32.1 KG/M2 | DIASTOLIC BLOOD PRESSURE: 84 MMHG

## 2022-12-16 DIAGNOSIS — Z34.90 PREGNANCY, UNSPECIFIED GESTATIONAL AGE: Primary | ICD-10-CM

## 2022-12-16 DIAGNOSIS — Z34.00 ENCOUNTER FOR SUPERVISION PREGNANCY IN PRIMIGRAVIDA, ANTEPARTUM: ICD-10-CM

## 2022-12-16 PROCEDURE — 0502F SUBSEQUENT PRENATAL CARE: CPT | Performed by: OBSTETRICS & GYNECOLOGY

## 2022-12-16 PROCEDURE — 76819 FETAL BIOPHYS PROFIL W/O NST: CPT | Performed by: OBSTETRICS & GYNECOLOGY

## 2022-12-16 PROCEDURE — 76805 OB US >/= 14 WKS SNGL FETUS: CPT | Performed by: OBSTETRICS & GYNECOLOGY

## 2022-12-16 NOTE — PROGRESS NOTES
MFM earlier today, report still pending. Relays RATNA 26cm, EFW 7 pounds 3 oz. Active FM  Discussed delivery plan today, recommendation for delivery 39-40 wks. Set up IOL for end of next week. Plan cytotec, no cook catheter given her friable/bleeding easily cervix.

## 2022-12-19 RX ORDER — BUSPIRONE HYDROCHLORIDE 15 MG/1
15 TABLET ORAL 2 TIMES DAILY
Qty: 60 TABLET | Refills: 11 | Status: ON HOLD | OUTPATIENT
Start: 2022-12-19

## 2022-12-19 NOTE — TELEPHONE ENCOUNTER
PCP: Jessy Roca MD    Last appt: Visit date not found  Future Appointments   Date Time Provider Natasha Meghan   12/23/2022  1:30 PM Lyly CANALES AMB   12/23/2022  2:10 PM Amanda Stoll MD [facility]  AMB   12/27/2022  9:99 PM MD CHENTE Chow  AMB       Requested Prescriptions     Pending Prescriptions Disp Refills    busPIRone (BUSPAR) 15 mg tablet 60 Tablet 11     Sig: Take 1 Tablet by mouth two (2) times a day.

## 2022-12-21 PROCEDURE — 3E033VJ INTRODUCTION OF OTHER HORMONE INTO PERIPHERAL VEIN, PERCUTANEOUS APPROACH: ICD-10-PCS | Performed by: OBSTETRICS & GYNECOLOGY

## 2022-12-22 ENCOUNTER — HOSPITAL ENCOUNTER (INPATIENT)
Age: 27
LOS: 4 days | Discharge: HOME | End: 2022-12-26
Attending: OBSTETRICS & GYNECOLOGY | Admitting: OBSTETRICS & GYNECOLOGY
Payer: COMMERCIAL

## 2022-12-22 PROBLEM — O40.3XX0 POLYHYDRAMNIOS AFFECTING PREGNANCY IN THIRD TRIMESTER: Status: ACTIVE | Noted: 2022-12-22

## 2022-12-22 PROBLEM — Z34.90 ENCOUNTER FOR INDUCTION OF LABOR: Status: ACTIVE | Noted: 2022-12-22

## 2022-12-22 LAB
BASOPHILS # BLD: 0 K/UL (ref 0–0.1)
BASOPHILS NFR BLD: 0 % (ref 0–1)
DIFFERENTIAL METHOD BLD: ABNORMAL
EOSINOPHIL # BLD: 0.1 K/UL (ref 0–0.4)
EOSINOPHIL NFR BLD: 1 % (ref 0–7)
ERYTHROCYTE [DISTWIDTH] IN BLOOD BY AUTOMATED COUNT: 12.9 % (ref 11.5–14.5)
HCT VFR BLD AUTO: 35.7 % (ref 35–47)
HGB BLD-MCNC: 12.3 G/DL (ref 11.5–16)
IMM GRANULOCYTES # BLD AUTO: 0.1 K/UL (ref 0–0.04)
IMM GRANULOCYTES NFR BLD AUTO: 1 % (ref 0–0.5)
LYMPHOCYTES # BLD: 2.5 K/UL (ref 0.8–3.5)
LYMPHOCYTES NFR BLD: 26 % (ref 12–49)
MCH RBC QN AUTO: 32.1 PG (ref 26–34)
MCHC RBC AUTO-ENTMCNC: 34.5 G/DL (ref 30–36.5)
MCV RBC AUTO: 93.2 FL (ref 80–99)
MONOCYTES # BLD: 0.9 K/UL (ref 0–1)
MONOCYTES NFR BLD: 9 % (ref 5–13)
NEUTS SEG # BLD: 6.2 K/UL (ref 1.8–8)
NEUTS SEG NFR BLD: 63 % (ref 32–75)
NRBC # BLD: 0 K/UL (ref 0–0.01)
NRBC BLD-RTO: 0 PER 100 WBC
PLATELET # BLD AUTO: 179 K/UL (ref 150–400)
PMV BLD AUTO: 11.5 FL (ref 8.9–12.9)
RBC # BLD AUTO: 3.83 M/UL (ref 3.8–5.2)
WBC # BLD AUTO: 9.7 K/UL (ref 3.6–11)

## 2022-12-22 PROCEDURE — 36415 COLL VENOUS BLD VENIPUNCTURE: CPT

## 2022-12-22 PROCEDURE — 74011250637 HC RX REV CODE- 250/637: Performed by: OBSTETRICS & GYNECOLOGY

## 2022-12-22 PROCEDURE — 75410000002 HC LABOR FEE PER 1 HR

## 2022-12-22 PROCEDURE — 65270000029 HC RM PRIVATE

## 2022-12-22 PROCEDURE — 4A1HXCZ MONITORING OF PRODUCTS OF CONCEPTION, CARDIAC RATE, EXTERNAL APPROACH: ICD-10-PCS | Performed by: OBSTETRICS & GYNECOLOGY

## 2022-12-22 PROCEDURE — 85025 COMPLETE CBC W/AUTO DIFF WBC: CPT

## 2022-12-22 PROCEDURE — 3E0DXGC INTRODUCTION OF OTHER THERAPEUTIC SUBSTANCE INTO MOUTH AND PHARYNX, EXTERNAL APPROACH: ICD-10-PCS | Performed by: OBSTETRICS & GYNECOLOGY

## 2022-12-22 RX ORDER — ACETAMINOPHEN 325 MG/1
650 TABLET ORAL
Status: DISCONTINUED | OUTPATIENT
Start: 2022-12-22 | End: 2022-12-24

## 2022-12-22 RX ORDER — OXYTOCIN/RINGER'S LACTATE 30/500 ML
87.3 PLASTIC BAG, INJECTION (ML) INTRAVENOUS AS NEEDED
Status: DISCONTINUED | OUTPATIENT
Start: 2022-12-22 | End: 2022-12-24

## 2022-12-22 RX ORDER — ONDANSETRON 2 MG/ML
4 INJECTION INTRAMUSCULAR; INTRAVENOUS
Status: DISCONTINUED | OUTPATIENT
Start: 2022-12-22 | End: 2022-12-24 | Stop reason: HOSPADM

## 2022-12-22 RX ORDER — NALBUPHINE HYDROCHLORIDE 10 MG/ML
10 INJECTION, SOLUTION INTRAMUSCULAR; INTRAVENOUS; SUBCUTANEOUS
Status: DISCONTINUED | OUTPATIENT
Start: 2022-12-22 | End: 2022-12-24 | Stop reason: HOSPADM

## 2022-12-22 RX ORDER — OXYTOCIN/RINGER'S LACTATE 30/500 ML
10 PLASTIC BAG, INJECTION (ML) INTRAVENOUS AS NEEDED
Status: COMPLETED | OUTPATIENT
Start: 2022-12-22 | End: 2022-12-24

## 2022-12-22 RX ORDER — DIPHENHYDRAMINE HCL 25 MG
25 CAPSULE ORAL
Status: DISCONTINUED | OUTPATIENT
Start: 2022-12-22 | End: 2022-12-24

## 2022-12-22 RX ORDER — TERBUTALINE SULFATE 1 MG/ML
0.25 INJECTION SUBCUTANEOUS AS NEEDED
Status: DISCONTINUED | OUTPATIENT
Start: 2022-12-22 | End: 2022-12-24 | Stop reason: HOSPADM

## 2022-12-22 RX ADMIN — DIPHENHYDRAMINE HYDROCHLORIDE 25 MG: 25 CAPSULE ORAL at 21:57

## 2022-12-22 RX ADMIN — Medication 25 MCG: at 18:16

## 2022-12-22 NOTE — PROGRESS NOTES
1700. Bedside and Verbal shift change report given to claire Wilson rn (oncoming nurse) by Nehemias Abarca rn (offgoing nurse). Report included the following information SBAR.      1930. Bedside and Verbal shift change report given to alexandra Richard rn (oncoming nurse) by claire Wilson rn (offgoing nurse). Report included the following information SBAR, Intake/Output, MAR, and Recent Results.

## 2022-12-22 NOTE — H&P
History and Physical    Patient: Chelsie Marquis MRN: [id number]  SSN: [id number]    YOB: 1995  Age: 31 y.o. Sex: female      Subjective:      Chelsie Marquis is a 31 y.o. female who presents for induction of labor for polyhydramnios. Pregnancy has also been complicated by multiple episodes of heavy vaginal bleeding, thought due to cervical etiology. Patient Active Problem List    Diagnosis    Encounter for supervision pregnancy in primigravida, antepartum     CYTOTEC  12/22/22  INDUCTION 12/23/22  Primary Provider: Love --> transfer from [address] @ 29w  G1  EDC by 1st tri US    Pregnancy problems:  Polyhydramnios: RATNA 28 at 34 weeks. EFW 5lb5oz c/w 44%ile, FU 37 weeks 30cm. FU 1 wk with MFM performed. Rec delivery before 40 weeks  - MFM US at 38w0d: EFW 7lb3oz c/w 53%ile, RATNA 56UP, cephalic. Bleeding at multiple times in pregnancy: heavy bleeding episodes in early pregnancy of unknown etiology s/p [facility] admissions. Also bleeds heavily with intercourse and cervical exams. Suspect will have bleeding in labor and will monitor closely. No cook catheter. GBS pos: PCN intrapartum ___    Hx MRSA: 1st tri culture neg    HSV1 in genital region: on daily Valtrex 1g    COVID in pregnancy in first tri: on ldASA, growth scan in third tri 34 weks EFW 44%ile, Ratna 28    Anxiety: Lexapro and Buspar  Rubella non-immune: PP vaccination___  Transfer from [address] [facility] at 29 weeks      IOB labs: B positive, normal, UC neg, GC/C neg  Genetic Screening: Materniti 21 normal BOY! Anatomy: BOY! Normal anatomy, fundal/anterior placenta  Flu: done 10/4 @ [address]  TDAP: done 10/4 @ [address]  Third Tri Labs: done @ [address] (117/11. 5)  GBS: pos    Pain mgmt. in labor: epidural   Feeding: breast - pump ordered  Circ:  Social:  Juanis Stinson is brother's of [facility] L&D nurse) - he works in Data analytics for [employer].  Scott Zuluaga works for an [employer], goes into the office (her father's business)  Birth preferences: no fentanyl      OCD (obsessive compulsive disorder)          Past Medical History:   Diagnosis Date    Depression     Endometriosis     Genital herpes     Interstitial cystitis     Leukocytosis 10/11/2013    Other ill-defined conditions(799.89)     mrsa    Psychiatric disorder     OCD anxiety    Psychotic disorder (HCC)     Right upper quadrant abdominal pain 10/11/2013    Sepsis ([address]      Past Surgical History:   Procedure Laterality Date    HX GYN      ENDOMETRIOUS    HX OTHER SURGICAL  2017    endometriosis    HX TONSIL AND ADENOIDECTOMY      HX WISDOM TEETH EXTRACTION      [place] ABDOMEN SURGERY PROC UNLISTED      polyp removed from intestines at age 1      Family History   Problem Relation Age of Onset    Cancer Mother         Breast     Social History     Tobacco Use    Smoking status: Never    Smokeless tobacco: Never   Substance Use Topics    Alcohol use: Yes     Comment: rarely      Prior to Admission medications    Medication Sig Start Date End Date Taking? Authorizing Provider   busPIRone (BUSPAR) 15 mg tablet Take 1 Tablet by mouth two (2) times a day. 12/19/22   Carmen Jones MD   aspirin 81 mg chewable tablet Take 81 mg by mouth daily. Provider, Historical   escitalopram oxalate (LEXAPRO) 20 mg tablet TAKE 1 TABLET BY MOUTH EVERY DAY 9/12/22   Carmen Jones MD   escitalopram oxalate (LEXAPRO) 10 mg tablet TAKE 1 TABLET BY MOUTH DAILY. SHE TAKES A 20 MG + 10 MG TO EQUAL 30 MG DAILY 5/31/22   Carmen Jones MD   valACYclovir (VALTREX) 1 gram tablet valacyclovir 1 gram tablet   TAKE 1 TABLET BY MOUTH EVERY DAY 2/14/16   Provider, Historical        No Known Allergies    Review of Systems:  Pertinent items are noted in the History of Present Illness.     Objective:     Vitals:    12/22/22 1623   BP: 131/88   Pulse: 78   Resp: 18   Temp: 99.2 °F (37.3 °C)        Physical Exam:  GENERAL: alert, cooperative, no distress, appears stated age  LUNG: clear to auscultation bilaterally  HEART: regular rate and rhythm, S1, S2 normal, no murmur, click, rub or gallop  ABDOMEN: soft, non-tender. Bowel sounds normal. No masses,  no organomegaly  EXTREMITIES:  extremities normal, atraumatic, no cyanosis or edema  SKIN: Normal.  NEUROLOGIC: negative  PSYCHIATRIC: non focal    Assessment:     Hospital Problems  Date Reviewed: 12/16/2022   None    Polyhydramnios, RATNA 26cm  GBS positive  Vaginal bleeding in pregnancy, thought due to cervical etiology  Genital HSV, no lesions      Plan:     Admit for IOL  Cervical ripening with cytotec 25mcg buccal every 4 hours x 6 doses.  First dose at 8-10pm. NO cook catheter given past bleeding episodes with intercourse or cervical exam.   PCN for GBS ppx when in active labor or after ROM    Signed By: Burton Cabral MD     December 22, 2022

## 2022-12-23 ENCOUNTER — ANESTHESIA EVENT (OUTPATIENT)
Dept: LABOR AND DELIVERY | Age: 27
End: 2022-12-23
Payer: COMMERCIAL

## 2022-12-23 ENCOUNTER — ANESTHESIA (OUTPATIENT)
Dept: LABOR AND DELIVERY | Age: 27
End: 2022-12-23
Payer: COMMERCIAL

## 2022-12-23 ENCOUNTER — TELEPHONE (OUTPATIENT)
Dept: OBGYN CLINIC | Age: 27
End: 2022-12-23

## 2022-12-23 PROCEDURE — 74011000250 HC RX REV CODE- 250: Performed by: STUDENT IN AN ORGANIZED HEALTH CARE EDUCATION/TRAINING PROGRAM

## 2022-12-23 PROCEDURE — 74011000258 HC RX REV CODE- 258: Performed by: OBSTETRICS & GYNECOLOGY

## 2022-12-23 PROCEDURE — 74011250636 HC RX REV CODE- 250/636: Performed by: STUDENT IN AN ORGANIZED HEALTH CARE EDUCATION/TRAINING PROGRAM

## 2022-12-23 PROCEDURE — 65270000029 HC RM PRIVATE

## 2022-12-23 PROCEDURE — 77030019905 HC CATH URETH INTMIT MDII -A

## 2022-12-23 PROCEDURE — A4300 CATH IMPL VASC ACCESS PORTAL: HCPCS

## 2022-12-23 PROCEDURE — 74011250636 HC RX REV CODE- 250/636: Performed by: OBSTETRICS & GYNECOLOGY

## 2022-12-23 PROCEDURE — 74011250637 HC RX REV CODE- 250/637: Performed by: OBSTETRICS & GYNECOLOGY

## 2022-12-23 PROCEDURE — 77030014125 HC TY EPDRL BBMI -B: Performed by: STUDENT IN AN ORGANIZED HEALTH CARE EDUCATION/TRAINING PROGRAM

## 2022-12-23 PROCEDURE — 75410000002 HC LABOR FEE PER 1 HR

## 2022-12-23 PROCEDURE — 77030003666 HC NDL SPINAL BD -A: Performed by: STUDENT IN AN ORGANIZED HEALTH CARE EDUCATION/TRAINING PROGRAM

## 2022-12-23 PROCEDURE — 76060000078 HC EPIDURAL ANESTHESIA

## 2022-12-23 RX ORDER — LIDOCAINE HYDROCHLORIDE AND EPINEPHRINE 15; 5 MG/ML; UG/ML
INJECTION, SOLUTION EPIDURAL
Status: COMPLETED | OUTPATIENT
Start: 2022-12-23 | End: 2022-12-23

## 2022-12-23 RX ORDER — NORETHINDRONE AND ETHINYL ESTRADIOL 0.5-0.035
12.5 KIT ORAL AS NEEDED
Status: DISCONTINUED | OUTPATIENT
Start: 2022-12-23 | End: 2022-12-24 | Stop reason: HOSPADM

## 2022-12-23 RX ORDER — NALBUPHINE HYDROCHLORIDE 10 MG/ML
5 INJECTION, SOLUTION INTRAMUSCULAR; INTRAVENOUS; SUBCUTANEOUS
Status: DISCONTINUED | OUTPATIENT
Start: 2022-12-23 | End: 2022-12-24 | Stop reason: HOSPADM

## 2022-12-23 RX ORDER — NORETHINDRONE AND ETHINYL ESTRADIOL 0.5-0.035
KIT ORAL
Status: DISCONTINUED
Start: 2022-12-23 | End: 2022-12-24 | Stop reason: WASHOUT

## 2022-12-23 RX ORDER — BUPIVACAINE HYDROCHLORIDE 2.5 MG/ML
INJECTION, SOLUTION EPIDURAL; INFILTRATION; INTRACAUDAL
Status: COMPLETED
Start: 2022-12-23 | End: 2022-12-23

## 2022-12-23 RX ORDER — OXYTOCIN/RINGER'S LACTATE 30/500 ML
1-25 PLASTIC BAG, INJECTION (ML) INTRAVENOUS
Status: DISCONTINUED | OUTPATIENT
Start: 2022-12-23 | End: 2022-12-23

## 2022-12-23 RX ORDER — BUPIVACAINE HYDROCHLORIDE 2.5 MG/ML
INJECTION, SOLUTION EPIDURAL; INFILTRATION; INTRACAUDAL AS NEEDED
Status: DISCONTINUED | OUTPATIENT
Start: 2022-12-23 | End: 2022-12-24 | Stop reason: HOSPADM

## 2022-12-23 RX ORDER — SODIUM CHLORIDE, SODIUM LACTATE, POTASSIUM CHLORIDE, CALCIUM CHLORIDE 600; 310; 30; 20 MG/100ML; MG/100ML; MG/100ML; MG/100ML
125 INJECTION, SOLUTION INTRAVENOUS CONTINUOUS
Status: DISCONTINUED | OUTPATIENT
Start: 2022-12-23 | End: 2022-12-24 | Stop reason: HOSPADM

## 2022-12-23 RX ORDER — BUPIVACAINE HYDROCHLORIDE 2.5 MG/ML
INJECTION, SOLUTION EPIDURAL; INFILTRATION; INTRACAUDAL
Status: COMPLETED | OUTPATIENT
Start: 2022-12-23 | End: 2022-12-23

## 2022-12-23 RX ORDER — FENTANYL/BUPIVACAINE/NS/PF 2-1250MCG
1-16 PREFILLED PUMP RESERVOIR EPIDURAL CONTINUOUS
Status: DISCONTINUED | OUTPATIENT
Start: 2022-12-23 | End: 2022-12-24 | Stop reason: HOSPADM

## 2022-12-23 RX ORDER — FENTANYL CITRATE 50 UG/ML
INJECTION, SOLUTION INTRAMUSCULAR; INTRAVENOUS
Status: COMPLETED
Start: 2022-12-23 | End: 2022-12-23

## 2022-12-23 RX ORDER — FENTANYL CITRATE 50 UG/ML
INJECTION, SOLUTION INTRAMUSCULAR; INTRAVENOUS AS NEEDED
Status: DISCONTINUED | OUTPATIENT
Start: 2022-12-23 | End: 2022-12-24 | Stop reason: HOSPADM

## 2022-12-23 RX ORDER — NALOXONE HYDROCHLORIDE 0.4 MG/ML
0.4 INJECTION, SOLUTION INTRAMUSCULAR; INTRAVENOUS; SUBCUTANEOUS AS NEEDED
Status: DISCONTINUED | OUTPATIENT
Start: 2022-12-23 | End: 2022-12-24 | Stop reason: HOSPADM

## 2022-12-23 RX ORDER — OXYTOCIN/RINGER'S LACTATE 30/500 ML
1-25 PLASTIC BAG, INJECTION (ML) INTRAVENOUS
Status: DISCONTINUED | OUTPATIENT
Start: 2022-12-23 | End: 2022-12-24

## 2022-12-23 RX ORDER — SODIUM CHLORIDE, SODIUM LACTATE, POTASSIUM CHLORIDE, CALCIUM CHLORIDE 600; 310; 30; 20 MG/100ML; MG/100ML; MG/100ML; MG/100ML
125 INJECTION, SOLUTION INTRAVENOUS CONTINUOUS
Status: DISCONTINUED | OUTPATIENT
Start: 2022-12-23 | End: 2022-12-24

## 2022-12-23 RX ADMIN — SODIUM CHLORIDE, POTASSIUM CHLORIDE, SODIUM LACTATE AND CALCIUM CHLORIDE 1000 ML: 600; 310; 30; 20 INJECTION, SOLUTION INTRAVENOUS at 19:57

## 2022-12-23 RX ADMIN — SODIUM CHLORIDE 5 MILLION UNITS: 900 INJECTION INTRAVENOUS at 16:16

## 2022-12-23 RX ADMIN — SODIUM CHLORIDE, POTASSIUM CHLORIDE, SODIUM LACTATE AND CALCIUM CHLORIDE 300 ML: 600; 310; 30; 20 INJECTION, SOLUTION INTRAVENOUS at 01:01

## 2022-12-23 RX ADMIN — SODIUM CHLORIDE 2.5 MILLION UNITS: 9 INJECTION, SOLUTION INTRAVENOUS at 20:47

## 2022-12-23 RX ADMIN — Medication 25 MCG: at 01:20

## 2022-12-23 RX ADMIN — Medication 25 MCG: at 10:46

## 2022-12-23 RX ADMIN — NALBUPHINE HYDROCHLORIDE 10 MG: 10 INJECTION, SOLUTION INTRAMUSCULAR; INTRAVENOUS; SUBCUTANEOUS at 16:03

## 2022-12-23 RX ADMIN — BUPIVACAINE HYDROCHLORIDE 0.5 ML: 2.5 INJECTION, SOLUTION EPIDURAL; INFILTRATION; INTRACAUDAL at 20:20

## 2022-12-23 RX ADMIN — BUPIVACAINE HYDROCHLORIDE 2 ML: 2.5 INJECTION, SOLUTION EPIDURAL; INFILTRATION; INTRACAUDAL; PERINEURAL at 20:20

## 2022-12-23 RX ADMIN — SODIUM CHLORIDE, POTASSIUM CHLORIDE, SODIUM LACTATE AND CALCIUM CHLORIDE 125 ML/HR: 600; 310; 30; 20 INJECTION, SOLUTION INTRAVENOUS at 16:01

## 2022-12-23 RX ADMIN — LIDOCAINE HYDROCHLORIDE,EPINEPHRINE BITARTRATE 1 ML: 15; .005 INJECTION, SOLUTION EPIDURAL; INFILTRATION; INTRACAUDAL; PERINEURAL at 20:20

## 2022-12-23 RX ADMIN — OXYTOCIN 2 MILLI-UNITS/MIN: 10 INJECTION INTRAVENOUS at 22:31

## 2022-12-23 RX ADMIN — Medication 25 MCG: at 05:18

## 2022-12-23 RX ADMIN — Medication 10 ML/HR: at 20:29

## 2022-12-23 RX ADMIN — Medication 12.5 MG: at 16:00

## 2022-12-23 RX ADMIN — SODIUM CHLORIDE, POTASSIUM CHLORIDE, SODIUM LACTATE AND CALCIUM CHLORIDE 125 ML/HR: 600; 310; 30; 20 INJECTION, SOLUTION INTRAVENOUS at 20:30

## 2022-12-23 RX ADMIN — NALBUPHINE HYDROCHLORIDE 10 MG: 10 INJECTION, SOLUTION INTRAMUSCULAR; INTRAVENOUS; SUBCUTANEOUS at 19:36

## 2022-12-23 RX ADMIN — FENTANYL CITRATE 100 MCG: 50 INJECTION, SOLUTION INTRAMUSCULAR; INTRAVENOUS at 20:20

## 2022-12-23 NOTE — PROGRESS NOTES
@4371 Bedside shift change report given to SARA Charles RN (oncoming nurse) by SHIKHA Ramon RN (offgoing nurse). Report included the following information SBAR, Kardex, Intake/Output, MAR, and Recent Results.

## 2022-12-23 NOTE — ROUTINE PROCESS
193: Bedside shift change report given to JOHN Ramon RN (oncoming nurse) by Keyana Leonard RN (offgoing nurse). Report included the following information SBAR.    2200: Unable to give scheduled cytotec due to contraction frequency  2300: Unable to give scheduled cytotec due to contraction frequency  2330: Reviewed contraction pattern with Dr Bishnu Alfonso. Patient not feeling contractions. Okay to give cytotec when unit acuity allows.   0101: 300cc fluid bolus to attempt and space contractions out  0110: Reviewed contraction pattern with Dr Bishnu Alfonso. Orders received to give cytotec. Patient denies feeling contractions. Abdomen palpates soft. 0120: Second dose of cytotec given per [facility]  0518: Third dose of cytotec given per [facility]  [id number]: SBAR given to [address], patient sleeping at this time.

## 2022-12-23 NOTE — TELEPHONE ENCOUNTER
Spoke with guardian STD & they asked if pt delivered I said not as of yet, advised first [facility] I saw of date 10/18/22 & advised planned induction for yesterday

## 2022-12-23 NOTE — PROGRESS NOTES
Labor Progress Note    Patient: Slade Magana YOB: 1995  Age: 31 y.o. Subjective:     She is now feeling moderately painful contractions, just received a dose of stadol and phenergan. She has not seen any vaginal bleeding. Objective:     Vital Signs:  Visit Vitals  /85   Pulse 80   Temp 98.1 °F (36.7 °C)   Resp 18        Cervical Exam:   Cervical Exam  Dilation (cm): 2 (2-3)  Eff: 80 %  Station: -2    FHR cat 1, reactive  [place]ald: ctx every 3-4 mins      Lab/Data Review:  Recent Results (from the past 24 hour(s))   CBC WITH AUTOMATED DIFF    Collection Time: 12/22/22  5:07 PM   Result Value Ref Range    WBC 9.7 3.6 - 11.0 K/uL    RBC 3.83 3.80 - 5.20 M/uL    HGB 12.3 11.5 - 16.0 g/dL    HCT 35.7 35.0 - 47.0 %    MCV 93.2 80.0 - 99.0 FL    MCH 32.1 26.0 - 34.0 PG    MCHC 34.5 30.0 - 36.5 g/dL    RDW 12.9 11.5 - 14.5 %    PLATELET 868 232 - 829 K/uL    MPV 11.5 8.9 - 12.9 FL    NRBC 0.0 0  WBC    ABSOLUTE NRBC 0.00 0.00 - 0.01 K/uL    NEUTROPHILS 63 32 - 75 %    LYMPHOCYTES 26 12 - 49 %    MONOCYTES 9 5 - 13 %    EOSINOPHILS 1 0 - 7 %    BASOPHILS 0 0 - 1 %    IMMATURE GRANULOCYTES 1 (H) 0.0 - 0.5 %    ABS. NEUTROPHILS 6.2 1.8 - 8.0 K/UL    ABS. LYMPHOCYTES 2.5 0.8 - 3.5 K/UL    ABS. MONOCYTES 0.9 0.0 - 1.0 K/UL    ABS. EOSINOPHILS 0.1 0.0 - 0.4 K/UL    ABS. BASOPHILS 0.0 0.0 - 0.1 K/UL    ABS. IMM. GRANS. 0.1 (H) 0.00 - 0.04 K/UL    DF AUTOMATED         Assessment/Plan:     Active Problems:    Polyhydramnios affecting pregnancy in third trimester (12/22/2022)      Encounter for induction of labor (12/22/2022)      IOL for polyhydramnios, RATNA 26cm. GBS neg.   Good response to cytotec, cervix now favorable and start pitocin when ctx pattern space  Start PCN for GBS ppx  Will have [facility] team help with labor management overnight, but plan to come back for delivery/emergency    Signed By: Con Vincent MD     December 23, 2022

## 2022-12-23 NOTE — PROGRESS NOTES
Labor Progress Note    Patient: Radha Rene YOB: 1995  Age: 31 y.o. Subjective:     Abby Rooney is feeling well this morning, a little nauseated. Feels mild cramping. Third dose of cytotec at 0518 am. , Kar Yepez, is at the bedside and supportive. Objective:     Vital Signs:  Visit Vitals  /81   Pulse (!) 59   Temp 99.2 °F (37.3 °C)   Resp 18      FHR cat 1, reactive  [place]: irreg ctx    Cervical Exam:    deferred    Lab/Data Review:  Recent Results (from the past 24 hour(s))   CBC WITH AUTOMATED DIFF    Collection Time: 12/22/22  5:07 PM   Result Value Ref Range    WBC 9.7 3.6 - 11.0 K/uL    RBC 3.83 3.80 - 5.20 M/uL    HGB 12.3 11.5 - 16.0 g/dL    HCT 35.7 35.0 - 47.0 %    MCV 93.2 80.0 - 99.0 FL    MCH 32.1 26.0 - 34.0 PG    MCHC 34.5 30.0 - 36.5 g/dL    RDW 12.9 11.5 - 14.5 %    PLATELET 343 894 - 769 K/uL    MPV 11.5 8.9 - 12.9 FL    NRBC 0.0 0  WBC    ABSOLUTE NRBC 0.00 0.00 - 0.01 K/uL    NEUTROPHILS 63 32 - 75 %    LYMPHOCYTES 26 12 - 49 %    MONOCYTES 9 5 - 13 %    EOSINOPHILS 1 0 - 7 %    BASOPHILS 0 0 - 1 %    IMMATURE GRANULOCYTES 1 (H) 0.0 - 0.5 %    ABS. NEUTROPHILS 6.2 1.8 - 8.0 K/UL    ABS. LYMPHOCYTES 2.5 0.8 - 3.5 K/UL    ABS. MONOCYTES 0.9 0.0 - 1.0 K/UL    ABS. EOSINOPHILS 0.1 0.0 - 0.4 K/UL    ABS. BASOPHILS 0.0 0.0 - 0.1 K/UL    ABS. IMM. GRANS. 0.1 (H) 0.00 - 0.04 K/UL    DF AUTOMATED         Assessment/Plan:     Active Problems:    Polyhydramnios affecting pregnancy in third trimester (12/22/2022)      Encounter for induction of labor (12/22/2022)      IOL for Polyhydramnios (RATNA 26 cm). Vaginal bleeding with cervical exams.  GBS pos.  - continue buccal cytotec for 6 doses total (s/p 3 doses so far)  - start PCN when ROM/active labor    Signed By: Christina King MD     December 23, 2022

## 2022-12-24 PROCEDURE — 77030031139 HC SUT VCRL2 J&J -A

## 2022-12-24 PROCEDURE — 77030040830 HC CATH URETH FOL MDII -A

## 2022-12-24 PROCEDURE — 74011000250 HC RX REV CODE- 250: Performed by: STUDENT IN AN ORGANIZED HEALTH CARE EDUCATION/TRAINING PROGRAM

## 2022-12-24 PROCEDURE — 75410000002 HC LABOR FEE PER 1 HR

## 2022-12-24 PROCEDURE — 65410000002 HC RM PRIVATE OB

## 2022-12-24 PROCEDURE — 59410 OBSTETRICAL CARE: CPT | Performed by: OBSTETRICS & GYNECOLOGY

## 2022-12-24 PROCEDURE — 75410000000 HC DELIVERY VAGINAL/SINGLE

## 2022-12-24 PROCEDURE — 0UQMXZZ REPAIR VULVA, EXTERNAL APPROACH: ICD-10-PCS | Performed by: OBSTETRICS & GYNECOLOGY

## 2022-12-24 PROCEDURE — 74011250636 HC RX REV CODE- 250/636: Performed by: STUDENT IN AN ORGANIZED HEALTH CARE EDUCATION/TRAINING PROGRAM

## 2022-12-24 PROCEDURE — 10D17Z9 MANUAL EXTRACTION OF PRODUCTS OF CONCEPTION, RETAINED, VIA NATURAL OR ARTIFICIAL OPENING: ICD-10-PCS | Performed by: OBSTETRICS & GYNECOLOGY

## 2022-12-24 PROCEDURE — 77030019905 HC CATH URETH INTMIT MDII -A

## 2022-12-24 PROCEDURE — 74011000258 HC RX REV CODE- 258: Performed by: OBSTETRICS & GYNECOLOGY

## 2022-12-24 PROCEDURE — 75410000003 HC RECOV DEL/VAG/CSECN EA 0.5 HR

## 2022-12-24 PROCEDURE — 74011250636 HC RX REV CODE- 250/636: Performed by: OBSTETRICS & GYNECOLOGY

## 2022-12-24 PROCEDURE — 74011250637 HC RX REV CODE- 250/637: Performed by: OBSTETRICS & GYNECOLOGY

## 2022-12-24 RX ORDER — AMMONIA 15 % (W/V)
1 AMPUL (EA) INHALATION AS NEEDED
Status: DISCONTINUED | OUTPATIENT
Start: 2022-12-24 | End: 2022-12-26 | Stop reason: HOSPADM

## 2022-12-24 RX ORDER — VALACYCLOVIR HYDROCHLORIDE 500 MG/1
1000 TABLET, FILM COATED ORAL DAILY
Status: DISCONTINUED | OUTPATIENT
Start: 2022-12-25 | End: 2022-12-26 | Stop reason: HOSPADM

## 2022-12-24 RX ORDER — HYDROCORTISONE 1 %
CREAM (GRAM) TOPICAL AS NEEDED
Status: DISCONTINUED | OUTPATIENT
Start: 2022-12-24 | End: 2022-12-26 | Stop reason: HOSPADM

## 2022-12-24 RX ORDER — DIPHENHYDRAMINE HCL 25 MG
25 CAPSULE ORAL
Status: DISCONTINUED | OUTPATIENT
Start: 2022-12-24 | End: 2022-12-26 | Stop reason: HOSPADM

## 2022-12-24 RX ORDER — ONDANSETRON 4 MG/1
4 TABLET, ORALLY DISINTEGRATING ORAL
Status: DISCONTINUED | OUTPATIENT
Start: 2022-12-24 | End: 2022-12-26 | Stop reason: HOSPADM

## 2022-12-24 RX ORDER — DOCUSATE SODIUM 100 MG/1
100 CAPSULE, LIQUID FILLED ORAL
Status: DISCONTINUED | OUTPATIENT
Start: 2022-12-24 | End: 2022-12-26 | Stop reason: HOSPADM

## 2022-12-24 RX ORDER — IBUPROFEN 400 MG/1
800 TABLET ORAL EVERY 8 HOURS
Status: DISCONTINUED | OUTPATIENT
Start: 2022-12-24 | End: 2022-12-26 | Stop reason: HOSPADM

## 2022-12-24 RX ORDER — OXYTOCIN/RINGER'S LACTATE 30/500 ML
87.3 PLASTIC BAG, INJECTION (ML) INTRAVENOUS AS NEEDED
Status: DISCONTINUED | OUTPATIENT
Start: 2022-12-24 | End: 2022-12-26 | Stop reason: HOSPADM

## 2022-12-24 RX ORDER — LIDOCAINE HYDROCHLORIDE 10 MG/ML
INJECTION INFILTRATION; PERINEURAL
Status: DISCONTINUED
Start: 2022-12-24 | End: 2022-12-24

## 2022-12-24 RX ORDER — BUSPIRONE HYDROCHLORIDE 10 MG/1
15 TABLET ORAL 2 TIMES DAILY
Status: DISCONTINUED | OUTPATIENT
Start: 2022-12-24 | End: 2022-12-26 | Stop reason: HOSPADM

## 2022-12-24 RX ORDER — ESCITALOPRAM OXALATE 10 MG/1
30 TABLET ORAL DAILY
Status: DISCONTINUED | OUTPATIENT
Start: 2022-12-24 | End: 2022-12-26 | Stop reason: HOSPADM

## 2022-12-24 RX ORDER — SIMETHICONE 80 MG
80 TABLET,CHEWABLE ORAL
Status: DISCONTINUED | OUTPATIENT
Start: 2022-12-24 | End: 2022-12-26 | Stop reason: HOSPADM

## 2022-12-24 RX ORDER — FENTANYL CITRATE 50 UG/ML
INJECTION, SOLUTION INTRAMUSCULAR; INTRAVENOUS
Status: COMPLETED
Start: 2022-12-24 | End: 2022-12-24

## 2022-12-24 RX ORDER — SODIUM CHLORIDE 0.9 % (FLUSH) 0.9 %
5-40 SYRINGE (ML) INJECTION AS NEEDED
Status: DISCONTINUED | OUTPATIENT
Start: 2022-12-24 | End: 2022-12-25

## 2022-12-24 RX ORDER — OXYTOCIN/RINGER'S LACTATE 30/500 ML
10 PLASTIC BAG, INJECTION (ML) INTRAVENOUS AS NEEDED
Status: DISCONTINUED | OUTPATIENT
Start: 2022-12-24 | End: 2022-12-26 | Stop reason: HOSPADM

## 2022-12-24 RX ORDER — SODIUM CHLORIDE 0.9 % (FLUSH) 0.9 %
5-40 SYRINGE (ML) INJECTION EVERY 8 HOURS
Status: DISCONTINUED | OUTPATIENT
Start: 2022-12-24 | End: 2022-12-25

## 2022-12-24 RX ORDER — BUPIVACAINE HYDROCHLORIDE 2.5 MG/ML
INJECTION, SOLUTION EPIDURAL; INFILTRATION; INTRACAUDAL
Status: COMPLETED
Start: 2022-12-24 | End: 2022-12-24

## 2022-12-24 RX ORDER — HYDROCORTISONE ACETATE PRAMOXINE HCL 2.5; 1 G/100G; G/100G
CREAM TOPICAL AS NEEDED
Status: DISCONTINUED | OUTPATIENT
Start: 2022-12-24 | End: 2022-12-26 | Stop reason: HOSPADM

## 2022-12-24 RX ORDER — ACETAMINOPHEN 325 MG/1
650 TABLET ORAL
Status: DISCONTINUED | OUTPATIENT
Start: 2022-12-24 | End: 2022-12-26 | Stop reason: HOSPADM

## 2022-12-24 RX ADMIN — IBUPROFEN 800 MG: 400 TABLET, FILM COATED ORAL at 19:35

## 2022-12-24 RX ADMIN — DOCUSATE SODIUM 100 MG: 100 CAPSULE ORAL at 16:16

## 2022-12-24 RX ADMIN — IBUPROFEN 800 MG: 400 TABLET, FILM COATED ORAL at 10:47

## 2022-12-24 RX ADMIN — OXYTOCIN 2 MILLI-UNITS/MIN: 10 INJECTION INTRAVENOUS at 06:53

## 2022-12-24 RX ADMIN — SODIUM CHLORIDE 2.5 MILLION UNITS: 9 INJECTION, SOLUTION INTRAVENOUS at 05:31

## 2022-12-24 RX ADMIN — SODIUM CHLORIDE 2.5 MILLION UNITS: 9 INJECTION, SOLUTION INTRAVENOUS at 01:16

## 2022-12-24 RX ADMIN — Medication 10 ML/HR: at 02:41

## 2022-12-24 RX ADMIN — SODIUM CHLORIDE, POTASSIUM CHLORIDE, SODIUM LACTATE AND CALCIUM CHLORIDE 500 ML: 600; 310; 30; 20 INJECTION, SOLUTION INTRAVENOUS at 02:44

## 2022-12-24 RX ADMIN — BUPIVACAINE HYDROCHLORIDE 8 ML: 2.5 INJECTION, SOLUTION EPIDURAL; INFILTRATION; INTRACAUDAL at 03:31

## 2022-12-24 RX ADMIN — OXYTOCIN 10000 MILLI-UNITS: 10 INJECTION INTRAVENOUS at 07:59

## 2022-12-24 RX ADMIN — FENTANYL CITRATE 100 MCG: 50 INJECTION, SOLUTION INTRAMUSCULAR; INTRAVENOUS at 03:31

## 2022-12-24 NOTE — L&D DELIVERY NOTE
Delivery Summary    Patient: Dylan Trammell MRN: [id number]  SSN: [id number]    YOB: 1995  Age: 31 y.o. Sex: female       Uncomplicated  of vigorous male  after approximately 1 hour of pushing. Infant placed on maternal abdomen and cord clamped after 1 minute and cut by the father of the baby. Bladder drained via straight catheter for 100cc urine. Cord avulsed from placenta with minimal traction, so placenta manually removed in one attempt. Placenta inspected and is intact. No perineal lacerations were identified. A right medial labial laceration was noted and repaired with 3-0 vicryl in a running fashion and a small left vaginal sulcus laceration was noted and repaired with 3-0 vicryl in a running manner.   Fundus firm and below the umbilicus, lochia was normal.       Information for the patient's :  Terri Riversaco [[id number]]     Labor Events:    Labor: No    Steroids: None   Cervical Ripening Date/Time: 2022 6:16 PM   Cervical Ripening Type: Misoprostol   Antibiotics During Labor:     Rupture Identifier:      Rupture Date/Time: 2022 6:32 PM   Rupture Type: SROM   Amniotic Fluid Volume: Large    Amniotic Fluid Description: Clear    Amniotic Fluid Odor:      Induction: Prostaglandins       Induction Date/Time:        Indications for Induction: Other(comment)    Augmentation:     Augmentation Date/Time:      Indications for Augmentation:     Labor complications: None       Additional complications:        Delivery Events:  Indications For Episiotomy:     Episiotomy: None   Perineal Laceration(s): None   Repaired:     Periurethral Laceration Location:      Repaired:     Labial Laceration Location: right   Repaired:     Sulcal Laceration Location: left   Repaired:     Vaginal Laceration Location:     Repaired:     Cervical Laceration Location:     Repaired:     Repair Suture: Vicryl 3-0   Number of Repair Packets: 2   Estimated Blood Loss (ml):  ml   Quantitative Blood Loss (ml)                Delivery Date: 2022    Delivery Time: 7:42 AM  Delivery Type: Vaginal, Spontaneous  Sex:  Male    Gestational Age: 36w3d   Delivery Clinician:  Braxton Schmid  Living Status: Living   Delivery Location: L&D LDR 10          APGARS  One minute Five minutes Ten minutes   Skin color: 1   1        Heart rate: 2   2        Grimace: 2   2        Muscle tone: 1   2        Breathin   2        Totals: 7   9            Presentation: Vertex    Position:        Resuscitation Method:  Suctioning-bulb;Suctioning-deep; Tactile Stimulation     Meconium Stained: None      Cord Information: 3 Vessels  Complications: Nuchal Cord Without Compressions  Cord around: head  Delayed cord clamping? Yes  Cord clamped date/time:2022  7:43 AM  Disposition of Cord Blood: Discard    Blood Gases Sent?: No    Placenta:  Date/Time: 2022  7:58 AM  Removal: Spontaneous      Appearance: Normal      Measurements:  Birth Weight:        Birth Length:        Head Circumference:        Chest Circumference:       Abdominal Girth: Other Providers:   MEGAN DUNN;AKHIL CHENEY;AMADOR SCOTT, Obstetrician;Primary Nurse;Primary  Nurse;Nursery Nurse         Group B Strep: No results found for: GRBSEXT, GRBSEXT  Information for the patient's :  Ekaterina Alejandro [[id number]]   No results found for: ABORH, PCTABR, PCTDIG, BILI, ABORHEXT, ABORH   No results for input(s): PCO2CB, PO2CB, HCO3I, SO2I, IBD, PTEMPI, SPECTI, PHICB, ISITE, IDEV, IALLEN in the last 72 hours.

## 2022-12-24 NOTE — ANESTHESIA PREPROCEDURE EVALUATION
Anesthetic History   No history of anesthetic complications            Review of Systems / Medical History  Patient summary reviewed, nursing notes reviewed and pertinent labs reviewed    Pulmonary  Within defined limits                 Neuro/Psych   Within defined limits      Psychiatric history     Cardiovascular  Within defined limits                Exercise tolerance: >4 METS     GI/Hepatic/Renal  Within defined limits              Endo/Other  Within defined limits           Other Findings              Physical Exam    Airway  Mallampati: II  TM Distance: 4 - 6 cm  Neck ROM: normal range of motion   Mouth opening: Normal     Cardiovascular  Regular rate and rhythm,  S1 and S2 normal,  no murmur, click, rub, or gallop             Dental  No notable dental hx       Pulmonary  Breath sounds clear to auscultation               Abdominal  GI exam deferred       Other Findings            Anesthetic Plan    ASA: 2  Anesthesia type: CSE            Anesthetic plan and risks discussed with: Patient

## 2022-12-24 NOTE — PROGRESS NOTES
CNM Labor Progress Note     Patient: Chelsie Marquis MRN: [id number]  SSN: [id number]    YOB: 1995  Age: 31 y.o. Sex: female        Subjective:   Patient comfortable and sleeping with epidural. Repositioned with Cubie, peanut ball. Family at bedside. Objective:   Patient Vitals for the past 4 hrs:   Temp Pulse Resp BP SpO2   22 0422 -- 85 -- 127/85 --   22 0321 -- 82 -- 119/85 --   22 0223 98.5 °F (36.9 °C) 81 15 112/76 93 %       Cervical Exam: 9 cm dilated    100% effaced    0 station    Membranes:   AROM    Current intervetions: reposition, resting  Pain management: epidural        Assessment:   Fetal Heart Rate: Baseline: 140 per minute  Variability: moderate  Accelerations: yes  Decelerations: none    Contractions:  External  Frequency: 3-4min  Duration: 50-90  Strength: moderate    Intrauterine pregnancy at term  Category 1 fetal heart rate tracing         Plan:   Continue current orders/management   CNM management   Re-evaluate in 2-4 hours or prn.   [facility] [address]

## 2022-12-24 NOTE — LACTATION NOTE
This note was copied from a baby's chart. Initial Lactation Consult- G-1 P-1 Mom delivered vaginally today at 39+wks. Mom has flattish nipples. She can easily express a lot of colostrum but has a hx of pierced nipples so the colostrum comes out of the sides of the nipples. Baby attempted to sucks and licked but could not achieve a good latch. We then tried the nipple shield and he sucked well on and off for 10 minutes. Mom was able to express about 2.5 mls into a spoon and it was fed to baby via syringe and spoon. Feeding Plan: Mother will keep baby skin to skin as often as possible, feed on demand, respond to feeding cues, obtain latch, listen for audible swallowing, be aware of signs of oxytocin release/ cramping,thrist,sleepyness while breastfeeding, offer both breasts,and will not limit feedings. She will use the shield as needed and express colostrum after until baby nursing well. All questions answered.

## 2022-12-24 NOTE — PROGRESS NOTES
0745:  Bedside and Verbal shift change report given to Kenn Medina RN (oncoming nurse) by Dalia Rooney RN (offgoing nurse). Report included the following information SBAR, Kardex, Intake/Output, MAR, and Recent Results. 1045:  TRANSFER - OUT REPORT:    Verbal report given to Rajni Desai GAVINO(name) on [address] [place] [address],[address]  being transferred to MIU(unit) for routine progression of care       Report consisted of patients Situation, Background, Assessment and   Recommendations(SBAR). Information from the following report(s) SBAR, Kardex, Intake/Output, MAR, and Recent Results was reviewed with the receiving nurse. Lines:   Peripheral IV 12/22/22 Left;Posterior Forearm (Active)   Site Assessment Clean, dry, & intact 12/23/22 0120   Phlebitis Assessment 0 12/23/22 0120   Infiltration Assessment 0 12/23/22 0120   Dressing Status Clean, dry, & intact 12/23/22 0120   Dressing Type Tape;Transparent 12/23/22 0120   Hub Color/Line Status Pink;Capped 12/23/22 0120        Opportunity for questions and clarification was provided.       Patient transported with:   Registered Nurse

## 2022-12-24 NOTE — ROUTINE PROCESS
1050: TRANSFER - IN REPORT:    Verbal report received from Radha Kidd RN(name) on [address] [place] [address],[address]  being received from L&D(unit) for routine progression of care      Report consisted of patients Situation, Background, Assessment and   Recommendations(SBAR). Information from the following report(s) SBAR was reviewed with the receiving nurse. Opportunity for questions and clarification was provided. Assessment completed upon patients arrival to unit and care assumed.

## 2022-12-24 NOTE — PROGRESS NOTES
[facility] Labor Progress Note     Patient: Sofía Henderson MRN: [id number]  SSN: [id number]    YOB: 1995  Age: 31 y.o. Sex: female        Subjective:   Patient feeling intense pressure in rectum. Pt in hands and knees and wants to push. SVE done. Cervix reduced easily to 10/C/+1. Great Fetal descent felt with pushes. Objective:   Patient Vitals for the past 4 hrs:   Pulse BP   22 0422 85 127/85   22 0321 82 119/85       Cervical Exam: 10 cm dilated    100% effaced    +1 station    Membranes:   AROM    Current intervetions: hands and knees, urge to push. Pain management: epidural        Assessment:   Fetal Heart Rate: Baseline: 150 per minute  Variability: moderate  Accelerations: yes  Decelerations: none    Contractions:  External  Frequency: 2-4min  Duration:   Strength: moderate    Intrauterine pregnancy at term  Category 1 fetal heart rate tracing         Plan:   Continue current orders/management   CNM management   Start pushing with urge.    Dr. Rosario Collier notified of delivery soon- she is on the way to hospital   Anticipate LEONEL Mar CNM

## 2022-12-24 NOTE — ANESTHESIA PROCEDURE NOTES
CSE Block    Start time: 12/23/2022 8:10 PM  End time: 12/23/2022 8:20 PM  Performed by: Saray Pitts DO  Authorized by: Saray Pitts DO     Pre-Procedure  Indications: primary anesthetic    preanesthetic checklist: patient identified, risks and benefits discussed, anesthesia consent, site marked, patient being monitored, timeout performed and fire risk safety assessment completed and verbalized    Timeout Time: 20:10 EST      Procedure:   Patient Position:  Seated  Prep Region:  Lumbar  Prep: DuraPrep    Location:  L3-4    Epidural Needle:   Needle Type:  Tuohy  Needle Gauge:  17 G  Injection Technique:  Loss of resistance using air  Attempts:  1    Spinal Needle:   Needle Type:  Pencil-tip  Needle Gauge:  25 G    Catheter:   Catheter Type:  Standard  Catheter Size:  19 G  Catheter at Skin Depth (cm):  8  Depth in Epidural Space (cm):  4  Events: no blood with aspiration, no cerebrospinal fluid with aspiration, no paresthesia, negative aspiration test and CSF confirmed    Test Dose:  Bupivacaine (PF) (MARCAINE) 0.25% Epidural - Epidural   2 mL - 12/23/2022 8:20:00 PM  lidocaine-EPINEPHrine (XYLOCAINE) 1.5 %-1:200,000 injection Epidural - Epidural   1 mL - 12/23/2022 8:20:00 PM  Med Admin time: 12/23/2022 8:20 PM    Assessment:   Catheter Secured:  Tegaderm and tape  Insertion:  Uncomplicated  Patient tolerance:  Patient tolerated the procedure well with no immediate complications

## 2022-12-25 PROCEDURE — 65410000002 HC RM PRIVATE OB

## 2022-12-25 PROCEDURE — 74011250637 HC RX REV CODE- 250/637: Performed by: OBSTETRICS & GYNECOLOGY

## 2022-12-25 RX ADMIN — IBUPROFEN 800 MG: 400 TABLET, FILM COATED ORAL at 12:53

## 2022-12-25 RX ADMIN — DOCUSATE SODIUM 100 MG: 100 CAPSULE ORAL at 18:55

## 2022-12-25 RX ADMIN — IBUPROFEN 800 MG: 400 TABLET, FILM COATED ORAL at 20:37

## 2022-12-25 RX ADMIN — IBUPROFEN 800 MG: 400 TABLET, FILM COATED ORAL at 04:20

## 2022-12-25 NOTE — ROUTINE PROCESS
2000: Bedside shift change report given to LIZBETH Najera RN (oncoming nurse) by Emma Whitmore RN (offgoing nurse). Report included the following information SBAR and Kardex.

## 2022-12-25 NOTE — ROUTINE PROCESS
Bedside and Verbal shift change report given to Lisa Gottlieb RN (oncoming nurse) by Mariia Murphy RN (offgoing nurse). Report included the following information SBAR.

## 2022-12-25 NOTE — PROGRESS NOTES
Postpartum Note  S/P , PPday #1  Ambulating and voiding without diff  Dax po and po meds well  Breastfeeding well established    The risks and benefits of the circumcision  procedure and anesthesia including: bleeding, infection, variability of cosmetic results were discussed at length with the mother. She is aware that future repeat procedures may be necessary. She gives informed consent to proceed as noted and her questions are answered. Consent obtained and Lidocaine orders entered. O: VSS, Afebrile       Patient Vitals for the past 24 hrs:   Temp Pulse Resp BP SpO2   22 0420 97.6 °F (36.4 °C) 71 16 129/88 97 %   22 2221 98.9 °F (37.2 °C) 77 15 115/78 99 %   22 1615 98.6 °F (37 °C) 72 16 125/81 98 %   22 1100 99.2 °F (37.3 °C) 85 16 117/85 98 %   22 1029 98.7 °F (37.1 °C) 87 16 130/77 --   22 0959 -- 96 -- (!) 152/94 --   22 0943 -- 91 -- 122/89 --           Breasts soft, NT        FF @ U-1 ML, Lochia Small Rubra        Perineum Intact        Ext NT, No REP, Neg Mikey's    A/P: Routine PP care          Maternal Education          Lactation consultation prn          Plan Discharge in am             WANDY Mckee/[facility]

## 2022-12-26 VITALS
HEART RATE: 86 BPM | TEMPERATURE: 98.2 F | DIASTOLIC BLOOD PRESSURE: 69 MMHG | SYSTOLIC BLOOD PRESSURE: 112 MMHG | OXYGEN SATURATION: 100 % | RESPIRATION RATE: 16 BRPM

## 2022-12-26 PROCEDURE — 74011250637 HC RX REV CODE- 250/637: Performed by: OBSTETRICS & GYNECOLOGY

## 2022-12-26 RX ORDER — IBUPROFEN 800 MG/1
800 TABLET ORAL EVERY 8 HOURS
Qty: 60 TABLET | Refills: 0 | Status: SHIPPED | OUTPATIENT
Start: 2022-12-26

## 2022-12-26 RX ADMIN — IBUPROFEN 800 MG: 400 TABLET, FILM COATED ORAL at 05:39

## 2022-12-26 NOTE — ROUTINE PROCESS
1845  pt called out   clot noted in toilet ?  Size of golf ball    fundus firm/-1   no active bleeding noted  pt aware to call nurse if she passes another clot or soaks a pad in an hour or less   will continue to monitor - - -

## 2022-12-26 NOTE — LACTATION NOTE
This note was copied from a baby's chart. Infant with bili in HR zone. He has been latching and nursing well, but is sleepy. Assisted mom with wake up techniques. Mom had just nursed prior to my visit; set mom up with a breastpump and she pumped 15 ml of milk followed by 4 ml of hand expressed. Assisted parents with syringe feeding 19 ml total of EBM to infant. Mom will continue to offer the breast every 2.5-3 hours followed by pumping/hand expression and will provided any EBM obtained to infant. She will supplement with formula if volume of EBM not adequate for supplementation. Breasts may become engorged when milk \"comes in\". How milk is made / normal phases of milk production, supply and demand discussed. Taught care of engorged breasts - frequent breastfeeding encouraged and breast massage ac. Then nurse the baby (or pump minimally for comfort). Apply cold compresses ac and/or pc x 15 minutes a few times a day for swelling or discomfort. May need to do this care for a couple of days. Discussed prevention and treatment of mastitis.

## 2022-12-26 NOTE — DISCHARGE INSTRUCTIONS
Postpartum Support Groups   We know that all of us are dealing with a tremendous amount of uncertainty, confusion and disruption to our daily lives, which may result in increased anxiety, depression and fear. If you are feeling unsettled or worse, please know that we are here to help. During this time of increased caution and care for one another, [facility]partum [facility] [place] (Leesa Mcgee) is offering virtual and in person support groups to ALL MOTHERS in [place] regardless of the age of your child/children as a way to help weather this emotional storm together. Social support is an important part of self-care during this time of physical distancing. Virtual postpartum support group meetings available at www. postpartumva.org  Warm Line: 814.794.3581    Breastfeeding Support Groups   1st and 3rd Wednesday of each month at [facility]  2nd and 4th Tuesday of each month at [facility] (in education center behind [employer])    [place] at [email or web address] under the \"About Us\" and \"Classes and Events tabs\"  POSTPARTUM DISCHARGE INSTRUCTIONS       Name:  Alaina Serrato  YOB: 1995  Admission Diagnosis:  Polyhydramnios affecting pregnancy in third trimester [O40.3XX0]  Encounter for induction of labor [Z34.90]     Discharge Diagnosis:  [unfilled]  Attending Physician:  [unfilled]    Delivery Type:  Vaginal Childbirth: What To Expect At Home    Your Recovery: Your body will slowly heal in the next few weeks. It is easy to get too tired and overwhelmed during the first weeks after your baby is born. Changes in your hormones can shift your mood without warning. You may find it hard to meet the extra demands on your energy and time. Take it easy on yourself. Follow-up care is a key part of your treatment and safety. Be sure to make and go to all appointments, and call your doctor if you are having problems.  It's also a good idea to know your test results and keep a list of the medicines you take. How can you care for yourself at home? Vaginal bleeding and cramps  After delivery, you will have a bloody discharge from the vagina. This will turn pink within a week and then white or yellow after about 10 days. It may last for 2 to 4 weeks or longer, until the uterus has healed. Use pads instead of tampons until you stop bleeding. Do not worry if you pass some blood clots, as long as they are smaller than a golf ball. If you have a tear or stitches in your vaginal area, change the pad at least every 4 hours to prevent soreness and infection. You may have cramps for the first few days after childbirth. These are normal and occur as the uterus shrinks to normal size. Take an over-the-counter pain medicine, such as acetaminophen (Tylenol), ibuprofen (Advil, Motrin), or naproxen (Aleve), for cramps. Read and follow all instructions on the label. Do not take aspirin, because it can cause more bleeding. Do not take acetaminophen (Tylenol) and other acetaminophen containing medications (i.e. Percocet) at the same time. Breast fullness  Your breasts may overfill (engorge) in the first few days after delivery. To help milk flow and to relieve pain, warm your breasts in the shower or by using warm, moist towels before nursing. If you are not nursing, do not put warmth on your breasts or touch your breasts. Wear a tight bra or sports bra and use ice until the fullness goes away. This usually takes 2 to 3 days. Put ice or a cold pack on your breast after nursing to reduce swelling and pain. Put a thin cloth between the ice and your skin. Activity  Eat a balanced diet. Do not try to lose weight by cutting calories. Keep taking your prenatal vitamins, or take a multivitamin. Get as much rest as you can. Try to take naps when your baby sleeps during the day. Get some exercise every day. But do not do any heavy exercise until your doctor says it is okay.      Wait until you are healed (about 4 to 6 weeks) before you have sexual intercourse. Your doctor will tell you when it is okay to have sex. Talk to your doctor about birth control. You can get pregnant even before your period returns. Also, you can get pregnant while you are breast-feeding. Mental Health  Many women get the \"baby blues\" during the first few days after childbirth. You may lose sleep, feel irritable, and cry easily. You may feel happy one minute and sad the next. Hormone changes are one cause of these emotional changes. Also, the demands of a new baby, along with visits from relatives or other family needs, add to a mother's stress. The \"baby blues\" often peak around the fourth day. Then they ease up in less than 2 weeks. If your moodiness or anxiety lasts for more than 2 weeks, or if you feel like life is not worth living, you may have postpartum depression. This is different for each mother. Some mothers with serious depression may worry intensely about their infant's well-being. Others may feel distant from their child. Some mothers might even feel that they might harm their baby. A mother may have signs of paranoia, wondering if someone is watching her. With all the changes in your life, you may not know if you are depressed. Pregnancy sometimes causes changes in how you feel that are similar to the symptoms of depression. Symptoms of depression include:  Feeling sad or hopeless and losing interest in daily activities. These are the most common symptoms of depression. Sleeping too much or not enough. Feeling tired. You may feel as if you have no energy. Eating too much or too little. [employer] [employer] ([employer]) offers a Warm line; Chat with the Expert phone sessions; Information and Articles about Pregnancy and Postpartum Mood Disorders; Comprehensive List of Free Support Groups; Knowledgeable local coordinators who will offer support, information, and resources; Guide to Resources on [employer];  Calendar of events in the  mood disorders community; Latest News and Research; and [facility] [address] for [employer]. Remember - You are not alone; You are not to blame; With help, you will be well. 1-439-519-PPD(8289). WWW. POSTPARTUM. NET   Writing or talking about death, such as writing suicide notes or talking about guns, knives, or pills. Keep the numbers for these national suicide hotlines: 8-063-718-TALK (3-520.761.9389) and 9-705-DZUTBSA (1-191.177.5365). If you or someone you know talks about suicide or feeling hopeless, get help right away. Constipation and Hemorrhoids  Drink plenty of fluids, enough so that your urine is light yellow or clear like water. If you have kidney, heart, or liver disease and have to limit fluids, talk with your doctor before you increase the amount of fluids you drink. Eat plenty of fiber each day. Have a bran muffin or bran cereal for breakfast, and try eating a piece of fruit for a mid-afternoon snack. For painful, itchy hemorrhoids, put ice or a cold pack on the area several times a day for 10 minutes at a time. Follow this by putting a warm compress on the area for another 10 to 20 minutes or by sitting in a shallow, warm bath. When should you call for help? Call 911 anytime you think you may need emergency care. For example, call if:  You are thinking of hurting yourself, your baby, or anyone else. You passed out (lost consciousness). You have symptoms of a blood clot in your lung (called a pulmonary embolism). These may include:    Sudden chest pain. Trouble breathing. Coughing up blood. Call your doctor now or seek immediate medical care if:  You have severe vaginal bleeding. You are soaking through a pad each hour for 2 or more hours. Your vaginal bleeding seems to be getting heavier or is still bright red 4 days after delivery. You are dizzy or lightheaded, or you feel like you may faint. You are vomiting or cannot keep fluids down.   You have a fever.  You have new or more belly pain. You pass tissue (not just blood). Your vaginal discharge smells bad. Your belly feels tender or full and hard. Your breasts are continuously painful or red. You feel sad, anxious, or hopeless for more than a few days. You have sudden, severe pain in your belly. You have symptoms of a blood clot in your leg (called a deep vein thrombosis),          such as:  Pain in your calf, back of the knee, thigh, or groin. Redness and swelling in your leg or groin. You have symptoms of preeclampsia, such as:  Sudden swelling of your face, hands, or feet. New vision problems (such as dimness or blurring). A severe headache. Your blood pressure is higher than it should be or rises suddenly. You have new nausea or vomiting. Watch closely for changes in your health, and be sure to contact your doctor if you have any problems. Additional Information:  Not applicable    These are general instructions for a healthy lifestyle:    No smoking/ No tobacco products/ Avoid exposure to second hand smoke    Surgeon General's Warning:  Quitting smoking now greatly reduces serious risk to your health. Obesity, smoking, and sedentary lifestyle greatly increases your risk for illness    A healthy diet, regular physical exercise & weight monitoring are important for maintaining a healthy lifestyle    Recognize signs and symptoms of STROKE:    F-face looks uneven    A-arms unable to move or move unevenly    S-speech slurred or non-existent    T-time-call 911 as soon as signs and symptoms begin - DO NOT go       back to bed or wait to see if you get better - TIME IS BRAIN. I have had the opportunity to make my options or choices for discharge. I have received and understand these instructions.

## 2022-12-26 NOTE — DISCHARGE SUMMARY
Obstetrical Discharge Summary     Name: Harleen Yanez MRN: [id number]  SSN: [id number]    YOB: 1995  Age: 31 y.o. Sex: female      Allergies: Patient has no known allergies. Admit Date: 2022    Discharge Date: 2022     Admitting Physician: Kamila Macias MD     Attending Physician:  Ninfa Crespo MD     * Admission Diagnoses: Polyhydramnios affecting pregnancy in third trimester [O40.3XX0]  Encounter for induction of labor [Z34.90]    * Discharge Diagnoses:   Information for the patient's :  Sarah Proctor [[id number]]   Delivery of a 6 lb 2.6 oz (2.795 kg) male infant via Vaginal, Spontaneous on 2022 at 7:42 AM  by Kamila Macias. Apgars were 7  and 9 . Additional Diagnoses:   Hospital Problems as of 2022 Date Reviewed: 2022            Codes Class Noted - Resolved POA    Polyhydramnios affecting pregnancy in third trimester ICD-10-CM: O40. 3XX0  ICD-9-CM: 657.03  2022 - Present Unknown        Encounter for induction of labor ICD-10-CM: Z34.90  ICD-9-CM: V22.1  2022 - Present Unknown          Lab Results   Component Value Date/Time    Rubella, External non-immune 2022 12:00 AM    ABO,Rh B positive 2022 12:00 AM      Immunization History   Administered Date(s) Administered    Influenza Vaccine 10/01/2013    Influenza Vaccine Split 10/02/2012       * Procedures:   * No surgery found *           * Discharge Condition: good    * Hospital Course: Normal hospital course following the delivery. * Disposition: Home    Discharge Medications:   Current Discharge Medication List        START taking these medications    Details   ibuprofen (MOTRIN) 800 mg tablet Take 1 Tablet by mouth every eight (8) hours. Qty: 60 Tablet, Refills: 0  Start date: 2022           CONTINUE these medications which have NOT CHANGED    Details   busPIRone (BUSPAR) 15 mg tablet Take 1 Tablet by mouth two (2) times a day.   Qty: 60 Tablet, Refills: 11      !! escitalopram oxalate (LEXAPRO) 20 mg tablet TAKE 1 TABLET BY MOUTH EVERY DAY  Qty: 90 Tablet, Refills: 3      !! escitalopram oxalate (LEXAPRO) 10 mg tablet TAKE 1 TABLET BY MOUTH DAILY. SHE TAKES A 20 MG + 10 MG TO EQUAL 30 MG DAILY  Qty: 90 Tablet, Refills: 3      valACYclovir (VALTREX) 1 gram tablet valacyclovir 1 gram tablet   TAKE 1 TABLET BY MOUTH EVERY DAY       ! ! - Potential duplicate medications found. Please discuss with provider. STOP taking these medications       aspirin 81 mg chewable tablet Comments:   Reason for Stopping:               * Follow-up Care/Patient Instructions:   Activity: Activity as tolerated, No sex for 6 weeks, and No heavy lifting for 6 weeks  Diet: Regular Diet  Wound Care: None needed    Follow up in 6 weeks with Dr. Kate Ferreira

## 2022-12-26 NOTE — ROUTINE PROCESS
Bedside and Verbal shift change report given to Jarrett Vallejo RN (oncoming nurse) by Shawnee Kapadia (offgoing nurse). Report included the following information SBAR.

## 2022-12-26 NOTE — PROGRESS NOTES
Post-Partum Day Number 2 Progress/Discharge Note    Patient doing well post-partum without significant complaint. She is voiding without difficulty, she reports normal lochia. She is ambulatiing without dizziness. Her pain is well controlled with oral pain medication. She is tolerating general diet. Vitals:  Patient Vitals for the past 8 hrs:   BP Temp Pulse Resp SpO2   22 0530 130/88 97.9 °F (36.6 °C) 63 16 98 %     Temp (24hrs), Av.6 °F (37 °C), Min:97.9 °F (36.6 °C), Max:99.4 °F (37.4 °C)        Exam:  Patient without distress. Abdomen soft, fundus firm at level of umbilicus, nontender               Lower extremities are negative for cords or tenderness; no swelling. Labs: No results found for this or any previous visit (from the past 24 hour(s)). Lab Results   Component Value Date/Time    Rubella, External non-immune 2022 12:00 AM    HBsAg, External negative 2022 12:00 AM    HIV, External non-reactive 2022 12:00 AM    Gonorrhea, External negative 2022 12:00 AM    Chlamydia, External negative 2022 12:00 AM       Assessment and Plan:    Postpartum Day #2, S/P . Doing well.    - d/c home   - F/U 6wk postpartum check, sooner prn

## 2022-12-26 NOTE — ROUTINE PROCESS
0740- Bedside shift change report given to JANEEN Mueller (oncoming nurse) by Rosaura De Oliveira RN (offgoing nurse). Report included the following information SBAR.     1400  - I have reviewed discharge instructions with the patient. The parent verbalized understanding.

## 2022-12-29 ENCOUNTER — TELEPHONE (OUTPATIENT)
Dept: OBGYN CLINIC | Age: 27
End: 2022-12-29

## 2022-12-29 NOTE — TELEPHONE ENCOUNTER
Called patient and advised per below    Carissa Jimenez MD  to Me    SS    3:35 PM  Continue feeding from both breasts by either pumping or placing  to breast. Use warm compress on area in axilla. Enlargement likely lymph node in axilla.      Pt verbalized understanding

## 2022-12-29 NOTE — TELEPHONE ENCOUNTER
Pt calling name and  verified, pt is RL pt she is s/p vaginal delivery 2022. Pt had to pump a lot to get her milk in faster because her baby was jaundice and now she either has a clogged duct or the start of mastitis, she has no fever and feels ok. But under her right arm pit she has a blueberry size knot it warm, red and tender . She is using cold compresses and those are helping. She was told after she pumped so much milk once her milk was in to stop pumping.       Please advise thank you

## 2023-02-20 NOTE — PROGRESS NOTES
Postpartum Visit    Rahat Marley is a 28 y.o.  presenting for her postpartum visit. She delivered on 22 by  and delivery was complicated by IOL for polyhydramnios. She has been doing well since discharged from the hospital with a normal postpartum course. She has noticed a labial lump which has not healed since delivery, she has had MRSA in the past.  Slight drainage a few week ago, but still present. Baby \"Zaid Hutchinson\" doing well!     Bleeding - none  Perineal/Incisional pain - none  Mood - good  Feeding - breast, going well  Contraception - desires Lo Loestrin OCP - discussed Slynd until done with breastfeeding        Past Medical History:   Diagnosis Date    Anemia     Depression     Endometriosis     Genital herpes     Interstitial cystitis     Leukocytosis 10/11/2013    Other ill-defined conditions(799.89)     mrsa    Psychiatric disorder     OCD anxiety    Psychotic disorder ([address]     Right upper quadrant abdominal pain 10/11/2013    Sepsis ([facility] [address]        Past Surgical History:   Procedure Laterality Date    HX GYN      ENDOMETRIOUS    HX OTHER SURGICAL  2017    endometriosis    HX TONSIL AND ADENOIDECTOMY      HX WISDOM TEETH EXTRACTION      ME UNLISTED PROCEDURE ABDOMEN PERITONEUM & OMENTUM      polyp removed from intestines at age 1       Family History   Problem Relation Age of Onset    Cancer Mother         Breast       Social History     Socioeconomic History    Marital status: SINGLE     Spouse name: Not on file    Number of children: Not on file    Years of education: Not on file    Highest education level: Not on file   Occupational History    Not on file   Tobacco Use    Smoking status: Never    Smokeless tobacco: Never   Substance and Sexual Activity    Alcohol use: Yes     Comment: rarely    Drug use: No    Sexual activity: Not on file   Other Topics Concern    Not on file   Social History Narrative    Not on file     Social Determinants of Health     Financial Resource Strain: Not on file   Food Insecurity: Not on file   Transportation Needs: Not on file   Physical Activity: Not on file   Stress: Not on file   Social Connections: Not on file   Intimate Partner Violence: Not on file   Housing Stability: Not on file       Current Outpatient Medications   Medication Sig Dispense Refill    drospirenone, contraceptive, (Slynd) 4 mg (28) tab Take 1 Tablet by mouth daily. 84 Each 3    clindamycin (CLEOCIN T) 1 % lotion Apply  to affected area two (2) times a day. use thin film on affected area 60 mL 0    busPIRone (BUSPAR) 15 mg tablet Take 1 Tablet by mouth two (2) times a day. 60 Tablet 11    escitalopram oxalate (LEXAPRO) 20 mg tablet TAKE 1 TABLET BY MOUTH EVERY DAY 90 Tablet 3    escitalopram oxalate (LEXAPRO) 10 mg tablet TAKE 1 TABLET BY MOUTH DAILY.  SHE TAKES A 20 MG + 10 MG TO EQUAL 30 MG DAILY 90 Tablet 3    valACYclovir (VALTREX) 1 gram tablet valacyclovir 1 gram tablet   TAKE 1 TABLET BY MOUTH EVERY DAY         No Known Allergies    Review of Systems - History obtained from the patient  Constitutional: negative for weight loss, fever, night sweats  HEENT: negative for hearing loss, earache, congestion, snoring, sorethroat  CV: negative for chest pain, palpitations, edema  Resp: negative for cough, shortness of breath, wheezing  GI: negative for change in bowel habits, abdominal pain, black or bloody stools  : negative for frequency, dysuria, hematuria, vaginal discharge  MSK: negative for back pain, joint pain, muscle pain  Breast: negative for breast lumps, nipple discharge, galactorrhea  Skin :negative for itching, rash, hives  Neuro: negative for dizziness, headache, confusion, weakness  Psych: negative for anxiety, depression, change in mood  Heme/lymph: negative for bleeding, bruising, pallor    Physical Exam    Visit Vitals  /70   Ht 5' 4\" (1.626 m)   Wt 158 lb (71.7 kg)   BMI 27.12 kg/m²         OBGyn Exam      Constitutional  Appearance: well-nourished, well developed, alert, in no acute distress    HENT  Head and Face: appears normal    Neck  Inspection/Palpation: normal appearance, no masses or tenderness  Thyroid: gland size normal, nontender    Chest  Respiratory Effort: non-labored breathing    Cardiovascular  Extremities: no peripheral edema    Gastrointestinal  Abdominal Examination: abdomen non-distended, non-tender to palpation, no masses present  Liver and spleen: no hepatomegaly present, spleen not palpable  Hernias: no hernias identified    Genitourinary  External Genitalia: normal appearance for age, no discharge present, no tenderness present, no inflammatory lesions present, no masses present, no atrophy present  Vagina: normal vaginal vault without central or paravaginal defects, no discharge present, no inflammatory lesions present, no masses present  Bladder: non-tender to palpation  Urethra: appears normal  Cervix: normal   Uterus: normal size, shape and consistency  Adnexa: no adnexal tenderness present, no adnexal masses present  Perineum: perineum within normal limits, no evidence of trauma, no rashes or skin lesions present. Perineal laceration well healed. Skin  General Inspection: no rash, no lesions identified    Neurologic/Psychiatric  Mental Status:  Orientation: grossly oriented to person, place and time  Mood and Affect: mood normal, affect appropriate      Assessment/Plan:    1. Postpartum care and examination    Doing well postpartum. No sign of PP depression or anxiety. Breastfeeding going well. Contraception - opts for POP, rx and sample Slynd given. Okay to return to normal activities (exercise, bath, intercourse). RTO for annual or sooner as needed.     Healing labial boil - rx topical clindamycin sent    Staci Layne MD

## 2023-02-21 ENCOUNTER — OFFICE VISIT (OUTPATIENT)
Dept: OBGYN CLINIC | Age: 28
End: 2023-02-21
Payer: COMMERCIAL

## 2023-02-21 VITALS
HEIGHT: 64 IN | BODY MASS INDEX: 26.98 KG/M2 | DIASTOLIC BLOOD PRESSURE: 70 MMHG | SYSTOLIC BLOOD PRESSURE: 108 MMHG | WEIGHT: 158 LBS

## 2023-02-21 PROCEDURE — 0503F POSTPARTUM CARE VISIT: CPT | Performed by: OBSTETRICS & GYNECOLOGY

## 2023-02-21 RX ORDER — DROSPIRENONE 4 MG/1
1 TABLET, FILM COATED ORAL DAILY
Qty: 84 EACH | Refills: 3 | Status: SHIPPED | OUTPATIENT
Start: 2023-02-21

## 2023-02-21 RX ORDER — CLINDAMYCIN PHOSPHATE 10 UG/ML
LOTION TOPICAL 2 TIMES DAILY
Qty: 60 ML | Refills: 0 | Status: SHIPPED | OUTPATIENT
Start: 2023-02-21

## 2023-02-28 ENCOUNTER — PATIENT MESSAGE (OUTPATIENT)
Dept: OBGYN CLINIC | Age: 28
End: 2023-02-28

## 2023-02-28 ENCOUNTER — TELEPHONE (OUTPATIENT)
Dept: OBGYN CLINIC | Age: 28
End: 2023-02-28

## 2023-02-28 NOTE — LETTER
NOTIFICATION RETURN TO WORK / SCHOOL    2/28/2023 10:01 AM    Ms. Lucrecia Stoll  [address]  [phone]      To Whom It May Concern:    Gonzalo Lemons is currently under the care of [address] [place] [address]. She will return to workl on: March 6 2023    If there are questions or concerns please have the patient contact our office.  [phone]        Sincerely,      Carlos A Alexis MD

## 2023-02-28 NOTE — TELEPHONE ENCOUNTER
Patient calling name and  verified. Pt calling requesting a letter to return to work for  and if ok to write to be sent to her MY Chart. She is s/p vaginal delivery 2022. Per patient her insurance requests a return to work letter. Also assisted patient to obtaining access to her MY chart.       Please advise Thank you

## 2023-05-22 RX ORDER — ESCITALOPRAM OXALATE 10 MG/1
TABLET ORAL
Qty: 90 TABLET | Refills: 3 | Status: SHIPPED | OUTPATIENT
Start: 2023-05-22

## 2023-06-29 ENCOUNTER — TELEPHONE (OUTPATIENT)
Age: 28
End: 2023-06-29

## 2023-06-30 RX ORDER — VALACYCLOVIR HYDROCHLORIDE 1 G/1
TABLET, FILM COATED ORAL
Qty: 30 TABLET | Refills: 3 | Status: SHIPPED | OUTPATIENT
Start: 2023-06-30

## 2023-08-09 ENCOUNTER — HOSPITAL ENCOUNTER (OUTPATIENT)
Facility: HOSPITAL | Age: 28
Discharge: HOME | End: 2023-08-12
Payer: COMMERCIAL

## 2023-08-09 DIAGNOSIS — M51.36 DDD (DEGENERATIVE DISC DISEASE), LUMBAR: ICD-10-CM

## 2023-08-09 PROCEDURE — 72120 X-RAY BEND ONLY L-S SPINE: CPT

## 2023-10-02 RX ORDER — VALACYCLOVIR HYDROCHLORIDE 1 G/1
TABLET, FILM COATED ORAL
Qty: 90 TABLET | Refills: 1 | Status: SHIPPED | OUTPATIENT
Start: 2023-10-02

## 2023-11-21 ENCOUNTER — OFFICE VISIT (OUTPATIENT)
Age: 28
End: 2023-11-21
Payer: COMMERCIAL

## 2023-11-21 VITALS
HEIGHT: 64 IN | TEMPERATURE: 97.4 F | WEIGHT: 148 LBS | RESPIRATION RATE: 17 BRPM | HEART RATE: 76 BPM | SYSTOLIC BLOOD PRESSURE: 98 MMHG | DIASTOLIC BLOOD PRESSURE: 66 MMHG | BODY MASS INDEX: 25.27 KG/M2 | OXYGEN SATURATION: 98 %

## 2023-11-21 DIAGNOSIS — Z00.00 ROUTINE GENERAL MEDICAL EXAMINATION AT A HEALTH CARE FACILITY: Primary | ICD-10-CM

## 2023-11-21 PROCEDURE — 99395 PREV VISIT EST AGE 18-39: CPT | Performed by: INTERNAL MEDICINE

## 2023-11-21 PROCEDURE — G8484 FLU IMMUNIZE NO ADMIN: HCPCS | Performed by: INTERNAL MEDICINE

## 2023-11-21 SDOH — ECONOMIC STABILITY: HOUSING INSECURITY
IN THE LAST 12 MONTHS, WAS THERE A TIME WHEN YOU DID NOT HAVE A STEADY PLACE TO SLEEP OR SLEPT IN A SHELTER (INCLUDING NOW)?: NO

## 2023-11-21 SDOH — ECONOMIC STABILITY: FOOD INSECURITY: WITHIN THE PAST 12 MONTHS, YOU WORRIED THAT YOUR FOOD WOULD RUN OUT BEFORE YOU GOT MONEY TO BUY MORE.: NEVER TRUE

## 2023-11-21 SDOH — ECONOMIC STABILITY: INCOME INSECURITY: HOW HARD IS IT FOR YOU TO PAY FOR THE VERY BASICS LIKE FOOD, HOUSING, MEDICAL CARE, AND HEATING?: NOT VERY HARD

## 2023-11-21 SDOH — ECONOMIC STABILITY: FOOD INSECURITY: WITHIN THE PAST 12 MONTHS, THE FOOD YOU BOUGHT JUST DIDN'T LAST AND YOU DIDN'T HAVE MONEY TO GET MORE.: NEVER TRUE

## 2023-11-21 ASSESSMENT — ANXIETY QUESTIONNAIRES
IF YOU CHECKED OFF ANY PROBLEMS ON THIS QUESTIONNAIRE, HOW DIFFICULT HAVE THESE PROBLEMS MADE IT FOR YOU TO DO YOUR WORK, TAKE CARE OF THINGS AT HOME, OR GET ALONG WITH OTHER PEOPLE: NOT DIFFICULT AT ALL
7. FEELING AFRAID AS IF SOMETHING AWFUL MIGHT HAPPEN: 0
5. BEING SO RESTLESS THAT IT IS HARD TO SIT STILL: 0
3. WORRYING TOO MUCH ABOUT DIFFERENT THINGS: 0
4. TROUBLE RELAXING: 0
2. NOT BEING ABLE TO STOP OR CONTROL WORRYING: 0
1. FEELING NERVOUS, ANXIOUS, OR ON EDGE: 0
6. BECOMING EASILY ANNOYED OR IRRITABLE: 0
GAD7 TOTAL SCORE: 0

## 2023-11-21 ASSESSMENT — PATIENT HEALTH QUESTIONNAIRE - PHQ9
SUM OF ALL RESPONSES TO PHQ QUESTIONS 1-9: 0
SUM OF ALL RESPONSES TO PHQ9 QUESTIONS 1 & 2: 0
SUM OF ALL RESPONSES TO PHQ QUESTIONS 1-9: 0
1. LITTLE INTEREST OR PLEASURE IN DOING THINGS: 0
2. FEELING DOWN, DEPRESSED OR HOPELESS: 0

## 2023-11-27 RX ORDER — ESCITALOPRAM OXALATE 20 MG/1
20 TABLET ORAL DAILY
Qty: 90 TABLET | Refills: 3 | Status: SHIPPED | OUTPATIENT
Start: 2023-11-27

## 2023-11-29 ENCOUNTER — TELEPHONE (OUTPATIENT)
Age: 28
End: 2023-11-29

## 2023-11-29 NOTE — TELEPHONE ENCOUNTER
Patient called in, name and  verified. Patient is calling as she was just prescribed Prednisone for sciatica nerve pain and wants to make sure this is safe to take while breastfeeding. Please advise.     Triage notes: Pt can be reached [phone]

## 2023-12-01 ENCOUNTER — HOSPITAL ENCOUNTER (OUTPATIENT)
Facility: HOSPITAL | Age: 28
Discharge: HOME | End: 2023-12-01
Attending: SPECIALIST
Payer: COMMERCIAL

## 2023-12-01 DIAGNOSIS — M51.26 HERNIATED NUCLEUS PULPOSUS, LUMBAR: ICD-10-CM

## 2023-12-01 PROCEDURE — 72148 MRI LUMBAR SPINE W/O DYE: CPT

## 2024-01-19 ENCOUNTER — NURSE ONLY (OUTPATIENT)
Age: 29
End: 2024-01-19

## 2024-01-19 DIAGNOSIS — Z00.00 ROUTINE GENERAL MEDICAL EXAMINATION AT A HEALTH CARE FACILITY: ICD-10-CM

## 2024-01-19 LAB
ALBUMIN SERPL-MCNC: 3.9 G/DL (ref 3.5–5)
ALBUMIN/GLOB SERPL: 1.2 (ref 1.1–2.2)
ALP SERPL-CCNC: 74 U/L (ref 45–117)
ALT SERPL-CCNC: 19 U/L (ref 12–78)
ANION GAP SERPL CALC-SCNC: 7 MMOL/L (ref 5–15)
AST SERPL-CCNC: 13 U/L (ref 15–37)
BASOPHILS # BLD: 0 K/UL (ref 0–0.1)
BASOPHILS NFR BLD: 1 % (ref 0–1)
BILIRUB SERPL-MCNC: 0.6 MG/DL (ref 0.2–1)
BUN SERPL-MCNC: 19 MG/DL (ref 6–20)
BUN/CREAT SERPL: 27 (ref 12–20)
CALCIUM SERPL-MCNC: 8.8 MG/DL (ref 8.5–10.1)
CHLORIDE SERPL-SCNC: 109 MMOL/L (ref 97–108)
CHOLEST SERPL-MCNC: 167 MG/DL
CO2 SERPL-SCNC: 25 MMOL/L (ref 21–32)
CREAT SERPL-MCNC: 0.71 MG/DL (ref 0.55–1.02)
DIFFERENTIAL METHOD BLD: NORMAL
EOSINOPHIL # BLD: 0.2 K/UL (ref 0–0.4)
EOSINOPHIL NFR BLD: 3 % (ref 0–7)
ERYTHROCYTE [DISTWIDTH] IN BLOOD BY AUTOMATED COUNT: 12.2 % (ref 11.5–14.5)
EST. AVERAGE GLUCOSE BLD GHB EST-MCNC: 97 MG/DL
GLOBULIN SER CALC-MCNC: 3.3 G/DL (ref 2–4)
GLUCOSE SERPL-MCNC: 93 MG/DL (ref 65–100)
HBA1C MFR BLD: 5 % (ref 4–5.6)
HCT VFR BLD AUTO: 40.6 % (ref 35–47)
HDLC SERPL-MCNC: 49 MG/DL
HDLC SERPL: 3.4 (ref 0–5)
HGB BLD-MCNC: 13.3 G/DL (ref 11.5–16)
IMM GRANULOCYTES # BLD AUTO: 0 K/UL (ref 0–0.04)
IMM GRANULOCYTES NFR BLD AUTO: 0 % (ref 0–0.5)
LDLC SERPL CALC-MCNC: 101.8 MG/DL (ref 0–100)
LYMPHOCYTES # BLD: 2.5 K/UL (ref 0.8–3.5)
LYMPHOCYTES NFR BLD: 42 % (ref 12–49)
MCH RBC QN AUTO: 30.4 PG (ref 26–34)
MCHC RBC AUTO-ENTMCNC: 32.8 G/DL (ref 30–36.5)
MCV RBC AUTO: 92.7 FL (ref 80–99)
MONOCYTES # BLD: 0.4 K/UL (ref 0–1)
MONOCYTES NFR BLD: 7 % (ref 5–13)
NEUTS SEG # BLD: 2.9 K/UL (ref 1.8–8)
NEUTS SEG NFR BLD: 47 % (ref 32–75)
NRBC # BLD: 0 K/UL (ref 0–0.01)
NRBC BLD-RTO: 0 PER 100 WBC
PLATELET # BLD AUTO: 319 K/UL (ref 150–400)
PMV BLD AUTO: 9.7 FL (ref 8.9–12.9)
POTASSIUM SERPL-SCNC: 4.5 MMOL/L (ref 3.5–5.1)
PROT SERPL-MCNC: 7.2 G/DL (ref 6.4–8.2)
RBC # BLD AUTO: 4.38 M/UL (ref 3.8–5.2)
SODIUM SERPL-SCNC: 141 MMOL/L (ref 136–145)
TRIGL SERPL-MCNC: 81 MG/DL
TSH SERPL DL<=0.05 MIU/L-ACNC: 2.88 UIU/ML (ref 0.36–3.74)
VLDLC SERPL CALC-MCNC: 16.2 MG/DL
WBC # BLD AUTO: 6 K/UL (ref 3.6–11)

## 2024-03-07 RX ORDER — DROSPIRENONE 4 MG/1
1 TABLET, FILM COATED ORAL DAILY
Qty: 84 TABLET | Refills: 0 | Status: SHIPPED | OUTPATIENT
Start: 2024-03-07

## 2024-04-08 RX ORDER — ESCITALOPRAM OXALATE 10 MG/1
TABLET ORAL
Qty: 90 TABLET | Refills: 3 | Status: SHIPPED | OUTPATIENT
Start: 2024-04-08

## 2024-04-30 ENCOUNTER — OFFICE VISIT (OUTPATIENT)
Age: 29
End: 2024-04-30
Payer: COMMERCIAL

## 2024-04-30 VITALS
HEIGHT: 63 IN | BODY MASS INDEX: 27.11 KG/M2 | WEIGHT: 153 LBS | SYSTOLIC BLOOD PRESSURE: 90 MMHG | DIASTOLIC BLOOD PRESSURE: 60 MMHG

## 2024-04-30 DIAGNOSIS — N64.4 BREAST PAIN, LEFT: ICD-10-CM

## 2024-04-30 DIAGNOSIS — Z01.419 ENCOUNTER FOR WELL WOMAN EXAM WITH ROUTINE GYNECOLOGICAL EXAM: Primary | ICD-10-CM

## 2024-04-30 PROCEDURE — 99395 PREV VISIT EST AGE 18-39: CPT | Performed by: OBSTETRICS & GYNECOLOGY

## 2024-04-30 RX ORDER — VALACYCLOVIR HYDROCHLORIDE 1 G/1
TABLET, FILM COATED ORAL
Qty: 90 TABLET | Refills: 4 | Status: SHIPPED | OUTPATIENT
Start: 2024-04-30

## 2024-04-30 RX ORDER — DROSPIRENONE 4 MG/1
1 TABLET, FILM COATED ORAL DAILY
Qty: 84 TABLET | Refills: 4 | Status: SHIPPED | OUTPATIENT
Start: 2024-04-30

## 2024-04-30 ASSESSMENT — PATIENT HEALTH QUESTIONNAIRE - PHQ9
2. FEELING DOWN, DEPRESSED OR HOPELESS: NOT AT ALL
SUM OF ALL RESPONSES TO PHQ9 QUESTIONS 1 & 2: 0
SUM OF ALL RESPONSES TO PHQ QUESTIONS 1-9: 0
1. LITTLE INTEREST OR PLEASURE IN DOING THINGS: NOT AT ALL
SUM OF ALL RESPONSES TO PHQ QUESTIONS 1-9: 0

## 2024-04-30 NOTE — PROGRESS NOTES
Annual exam    Nya Jacob is a 28 y.o. presenting for annual exam.     She has the following gynecologic concerns today:  wellness    Her menses are absent on the Slynd.  Some irregular spotting since she stopped nursing.      for 14.5 months!  (1.5 months ago now).    She reports left breast pain, started last night.  Her mother has had breast cancer twice, so she is worried.      She is using Slynd POP for contraception.    She is due for cervical cancer screening.      She declines a chaperone during the gynecologic exam today.     Ob/Gyn Hx:  G [id number],  x1  LMP - absent since delivery      Past Medical History:   Diagnosis Date    Anemia     Depression     Endometriosis     Genital herpes     Interstitial cystitis     Leukocytosis 10/11/2013    Other ill-defined conditions(799.89)     mrsa    Psychiatric disorder     OCD anxiety    Psychotic disorder (HCC)     Right upper quadrant abdominal pain 10/11/2013    Sepsis (HCC)        Past Surgical History:   Procedure Laterality Date    GYN      ENDOMETRIOUS    OTHER SURGICAL HISTORY  2017    endometriosis    [place] UNLISTED PROCEDURE ABDOMEN PERITONEUM & OMENTUM      polyp removed from intestines at age 2    TONSILLECTOMY AND ADENOIDECTOMY      WISDOM TOOTH EXTRACTION         Family History   Problem Relation Age of Onset    Cancer Mother         Breast       Social History     Socioeconomic History    Marital status: Single     Spouse name: Not on file    Number of children: Not on file    Years of education: Not on file    Highest education level: Not on file   Occupational History    Not on file   Tobacco Use    Smoking status: Never    Smokeless tobacco: Never   Substance and Sexual Activity    Alcohol use: Yes    Drug use: No    Sexual activity: Yes     Partners: Male   Other Topics Concern    Not on file   Social History Narrative    Not on file     Social Determinants of Health     Financial Resource Strain: Low Risk  (2023)    Overall

## 2024-04-30 NOTE — PROGRESS NOTES
Chief Complaint   Patient presents with    Annual Exam       Ob/Gyn Hx:    LMP - No LMP recorded. (Menstrual status: Chemically Induced).  Menses - absent on Slynd  Contraception - Slynd  STI - none  SA - yes, male    Health maintenance:  Last Pap: about 2 years ago, normal per patient  Gardasil: completed     1. Have you been to the ER, urgent care clinic, or hospitalized since your last visit?No    2. Have you seen or consulted any other health care providers outside of the [facility] since your last visit? No    Patient declines chaperone.    Lexi Braden LPN

## 2024-05-02 LAB
., LABCORP: NORMAL
CYTOLOGIST CVX/VAG CYTO: NORMAL
CYTOLOGY CVX/VAG DOC CYTO: NORMAL
CYTOLOGY CVX/VAG DOC THIN PREP: NORMAL
DX ICD CODE: NORMAL
Lab: NORMAL
OTHER STN SPEC: NORMAL
STAT OF ADQ CVX/VAG CYTO-IMP: NORMAL

## 2024-05-08 ENCOUNTER — HOSPITAL ENCOUNTER (OUTPATIENT)
Facility: HOSPITAL | Age: 29
Discharge: HOME | End: 2024-05-11
Attending: OBSTETRICS & GYNECOLOGY
Payer: COMMERCIAL

## 2024-05-08 VITALS — BODY MASS INDEX: 26.22 KG/M2 | WEIGHT: 148 LBS | HEIGHT: 63 IN

## 2024-05-08 DIAGNOSIS — N64.4 BREAST PAIN, LEFT: ICD-10-CM

## 2024-05-08 PROCEDURE — 76642 ULTRASOUND BREAST LIMITED: CPT

## 2024-05-09 DIAGNOSIS — Z91.89 INCREASED RISK OF BREAST CANCER: ICD-10-CM

## 2024-05-09 DIAGNOSIS — Z80.3 FAMILY HISTORY OF BREAST CANCER: Primary | ICD-10-CM

## 2024-07-18 DIAGNOSIS — F42.9 OBSESSIVE-COMPULSIVE DISORDER, UNSPECIFIED TYPE: Primary | ICD-10-CM

## 2024-07-18 NOTE — TELEPHONE ENCOUNTER
PCP: Chirag Torres MD    Last appt: 11/21/2023  Future Appointments   Date Time Provider Department Center   1/7/2025  8:30 AM Domi Lockett, APRN - NP [facility]       Requested Prescriptions     Pending Prescriptions Disp Refills    busPIRone (BUSPAR) 15 MG tablet 60 tablet 5     Sig: Take 15 mg by mouth 2 times daily

## 2024-07-18 NOTE — TELEPHONE ENCOUNTER
busPIRone (BUSPAR) 15 MG tablet    Refill to [facility][id number] [facility].    Pt states pharm has tried to get for 3 days.  Is there anyway pt can get as soon as possible?    Pt is out of this med and can't be off of it.

## 2024-07-19 RX ORDER — BUSPIRONE HYDROCHLORIDE 15 MG/1
15 TABLET ORAL 2 TIMES DAILY
Qty: 60 TABLET | Refills: 5 | Status: SHIPPED | OUTPATIENT
Start: 2024-07-19

## 2024-08-11 DIAGNOSIS — F42.9 OBSESSIVE-COMPULSIVE DISORDER, UNSPECIFIED TYPE: ICD-10-CM

## 2024-08-12 RX ORDER — BUSPIRONE HYDROCHLORIDE 15 MG/1
15 TABLET ORAL 2 TIMES DAILY
Qty: 180 TABLET | Refills: 2 | Status: SHIPPED | OUTPATIENT
Start: 2024-08-12

## 2024-09-12 RX ORDER — DESOGESTREL AND ETHINYL ESTRADIOL 0.15-0.03
1 KIT ORAL DAILY
Qty: 3 PACKET | Refills: 0 | Status: SHIPPED | OUTPATIENT
Start: 2024-09-12

## 2024-09-23 RX ORDER — DESOGESTREL AND ETHINYL ESTRADIOL 0.15-0.03
KIT ORAL
Refills: 0 | OUTPATIENT
Start: 2024-09-23

## 2024-11-27 RX ORDER — DESOGESTREL AND ETHINYL ESTRADIOL 0.15-0.03
1 KIT ORAL DAILY
Qty: 84 TABLET | Refills: 2 | Status: SHIPPED | OUTPATIENT
Start: 2024-11-27

## 2024-12-09 RX ORDER — ESCITALOPRAM OXALATE 20 MG/1
20 TABLET ORAL DAILY
Qty: 90 TABLET | Refills: 3 | Status: SHIPPED | OUTPATIENT
Start: 2024-12-09

## 2025-01-07 ENCOUNTER — OFFICE VISIT (OUTPATIENT)
Age: 30
End: 2025-01-07
Payer: COMMERCIAL

## 2025-01-07 VITALS — HEIGHT: 63 IN | WEIGHT: 148 LBS | BODY MASS INDEX: 26.22 KG/M2

## 2025-01-07 DIAGNOSIS — Z80.3 FAMILY HISTORY OF BREAST CANCER: Primary | ICD-10-CM

## 2025-01-07 PROCEDURE — G8419 CALC BMI OUT NRM PARAM NOF/U: HCPCS | Performed by: NURSE PRACTITIONER

## 2025-01-07 PROCEDURE — 99203 OFFICE O/P NEW LOW 30 MIN: CPT | Performed by: NURSE PRACTITIONER

## 2025-01-07 PROCEDURE — 1036F TOBACCO NON-USER: CPT | Performed by: NURSE PRACTITIONER

## 2025-01-07 PROCEDURE — M1308 PR FLU IMMUNIZE NO ADMIN: HCPCS | Performed by: NURSE PRACTITIONER

## 2025-01-07 PROCEDURE — G8427 DOCREV CUR MEDS BY ELIG CLIN: HCPCS | Performed by: NURSE PRACTITIONER

## 2025-01-07 NOTE — PROGRESS NOTES
HISTORY OF PRESENT ILLNESS  Nya Jacob is a 29 y.o. female     HPI New patient presents for evaluation as a high risk patient due to her family history of breast cancer. Denies breast mass, skin changes, nipple discharge and pain.          Breast history -   Referring - Dr. Jacob  No history of breast biopsies        Family history -   Mom - breast cancer at 50 and 55; reports mom had negative genetic testing      OB History               Para        Term                AB        Living   1         SAB        IAB        Ectopic        Molar        Multiple        Live Births   1          Obstetric Comments   Menarche 13, LMP 24, # of children 1, age of 1st delivery 26, Hysterectomy/oophorectomy no/no, Breast bx no, history of breast feeding yes, BCP yes, Hormone therapy in the past                   Past Surgical History:   Procedure Laterality Date    GYN      ENDOMETRIOUS    OTHER SURGICAL HISTORY  2017    endometriosis    [place] UNLISTED PROCEDURE ABDOMEN PERITONEUM & OMENTUM      polyp removed from intestines at age 2    TONSILLECTOMY AND ADENOIDECTOMY      WISDOM TOOTH EXTRACTION         US Result (most recent):  US BREAST LIMITED LEFT 2024    Narrative  INDICATION:   Mastodynia left upper outer quadrant tenderness for 1 month.  Lactation ended 2024. Mother with breast cancer at age 50, BRCA negative.  Lifetime risk assessment by Anitaer-Yaack 20.4%.    COMPARISON:  None.    FINDINGS:  Breast sonography was performed of the region of clinical concern in  the left upper outer breast.  The exam demonstrates no evidence for discrete  cystic or solid mass.    Impression  1. No sonographic abnormality in the left upper outer quadrant at the site of  focal pain described by the patient..  2. Assessment Category 2: Benign finding.  3. RECOMMENDATION: Lifetime risk assessment 20.4% by Anitaer-Cujoyceck. Annual  screening mammography is recommended at age 30, with commencement recommended

## 2025-04-28 RX ORDER — ESCITALOPRAM OXALATE 10 MG/1
TABLET ORAL
Qty: 90 TABLET | Refills: 3 | Status: SHIPPED | OUTPATIENT
Start: 2025-04-28

## 2025-05-30 DIAGNOSIS — F42.9 OBSESSIVE-COMPULSIVE DISORDER, UNSPECIFIED TYPE: ICD-10-CM

## 2025-05-30 RX ORDER — BUSPIRONE HYDROCHLORIDE 15 MG/1
15 TABLET ORAL 2 TIMES DAILY
Qty: 180 TABLET | Refills: 2 | Status: SHIPPED | OUTPATIENT
Start: 2025-05-30

## 2025-05-30 RX ORDER — VALACYCLOVIR HYDROCHLORIDE 1 G/1
1000 TABLET, FILM COATED ORAL DAILY
Qty: 90 TABLET | Refills: 4 | OUTPATIENT
Start: 2025-05-30

## 2025-06-18 RX ORDER — VALACYCLOVIR HYDROCHLORIDE 1 G/1
1000 TABLET, FILM COATED ORAL DAILY
Qty: 30 TABLET | Refills: 0 | Status: SHIPPED | OUTPATIENT
Start: 2025-06-18

## 2025-06-25 ENCOUNTER — OFFICE VISIT (OUTPATIENT)
Age: 30
End: 2025-06-25
Payer: COMMERCIAL

## 2025-06-25 VITALS
SYSTOLIC BLOOD PRESSURE: 99 MMHG | OXYGEN SATURATION: 98 % | BODY MASS INDEX: 22.36 KG/M2 | DIASTOLIC BLOOD PRESSURE: 65 MMHG | WEIGHT: 131 LBS | TEMPERATURE: 97.7 F | RESPIRATION RATE: 14 BRPM | HEIGHT: 64 IN | HEART RATE: 63 BPM

## 2025-06-25 DIAGNOSIS — Z11.3 SCREEN FOR STD (SEXUALLY TRANSMITTED DISEASE): ICD-10-CM

## 2025-06-25 DIAGNOSIS — Z01.419 ENCOUNTER FOR GYNECOLOGICAL EXAMINATION WITHOUT ABNORMAL FINDING: Primary | ICD-10-CM

## 2025-06-25 PROCEDURE — 99395 PREV VISIT EST AGE 18-39: CPT | Performed by: OBSTETRICS & GYNECOLOGY

## 2025-06-25 SDOH — ECONOMIC STABILITY: FOOD INSECURITY: WITHIN THE PAST 12 MONTHS, THE FOOD YOU BOUGHT JUST DIDN'T LAST AND YOU DIDN'T HAVE MONEY TO GET MORE.: NEVER TRUE

## 2025-06-25 SDOH — ECONOMIC STABILITY: FOOD INSECURITY: WITHIN THE PAST 12 MONTHS, YOU WORRIED THAT YOUR FOOD WOULD RUN OUT BEFORE YOU GOT MONEY TO BUY MORE.: NEVER TRUE

## 2025-06-25 ASSESSMENT — PATIENT HEALTH QUESTIONNAIRE - PHQ9
SUM OF ALL RESPONSES TO PHQ QUESTIONS 1-9: 0
1. LITTLE INTEREST OR PLEASURE IN DOING THINGS: NOT AT ALL
2. FEELING DOWN, DEPRESSED OR HOPELESS: NOT AT ALL
SUM OF ALL RESPONSES TO PHQ QUESTIONS 1-9: 0

## 2025-06-25 NOTE — PROGRESS NOTES
Annual exam    Nya Jacob is a 29 y.o. presenting for annual exam.     She has the following gynecologic concerns today: wellness    Her menses are regular. The flow is heavy but still manageable.    She is using no rx method for contraception.  Thinking of trying for conception again this Fall!    She is up to date on cervical cancer screening.      She declines a chaperone during the gynecologic exam today.     Ob/Gyn Hx:  ,  x 1 - \"Trejo\" Dr. Jacob  LMP 2025   Menses: regular  Sexually Active: yes   Birth Control: none   STD: yes swab      Health Maintenance:  Last Pap: 2024 NIL         Past Medical History:   Diagnosis Date    Anemia     BRCA1 negative     BRCA2 negative     Depression     Endometriosis     Genital herpes     Interstitial cystitis     Leukocytosis 10/11/2013    Other ill-defined conditions(799.89)     mrsa    Psychiatric disorder     OCD anxiety    Psychotic disorder (HCC)     Right upper quadrant abdominal pain 10/11/2013    Sepsis (HCC)        Past Surgical History:   Procedure Laterality Date    GYN      ENDOMETRIOUS    OTHER SURGICAL HISTORY  2017    endometriosis    AR UNLISTED PROCEDURE ABDOMEN PERITONEUM & OMENTUM      polyp removed from intestines at age 2    TONSILLECTOMY AND ADENOIDECTOMY      WISDOM TOOTH EXTRACTION         Family History   Problem Relation Age of Onset    Breast Cancer Mother 50        Breast  diagnosed @ age 55 also       Social History     Socioeconomic History    Marital status: Single     Spouse name: Not on file    Number of children: Not on file    Years of education: Not on file    Highest education level: Not on file   Occupational History    Not on file   Tobacco Use    Smoking status: Never    Smokeless tobacco: Never   Vaping Use    Vaping status: Not on file   Substance and Sexual Activity    Alcohol use: Yes    Drug use: No    Sexual activity: Yes     Partners: Male   Other Topics Concern    Not on file   Social History

## 2025-06-25 NOTE — PROGRESS NOTES
Chief Complaint   Patient presents with    Annual Exam       Nya Jacob is a 29 y.o. female and is being seen for an annual exam. Wants to get back on birth control, was preciously on Loestrin fe and Slynd       OBGYN Hx:    - vaginal   Patient's last menstrual period was 2025 (exact date).  Her periods are heavy in flow and usually regular with a 26-32 day interval with 3-7 day duration.  She does not have dysmenorrhea.  Sexually Active: yes   Birth Control: no  has been over year   STD: yes swab     Health Maintenance:    Last Pap: 2024 NIL   HPV: yes       1. Have you been to the ER, urgent care clinic, or hospitalized since your last visit? no    2. Have you seen or consulted any other health care providers outside of the [facility] since your last visit? No      Darcy Mendes MA.

## 2025-06-29 ENCOUNTER — RESULTS FOLLOW-UP (OUTPATIENT)
Age: 30
End: 2025-06-29

## 2025-06-29 LAB
C TRACH RRNA SPEC QL NAA+PROBE: NEGATIVE
N GONORRHOEA RRNA SPEC QL NAA+PROBE: NEGATIVE
T VAGINALIS RRNA SPEC QL NAA+PROBE: NEGATIVE

## 2025-07-29 RX ORDER — VALACYCLOVIR HYDROCHLORIDE 1 G/1
1000 TABLET, FILM COATED ORAL DAILY
Qty: 30 TABLET | Refills: 3 | Status: SHIPPED | OUTPATIENT
Start: 2025-07-29

## (undated) DEVICE — TABLE COVER: Brand: CONVERTORS

## (undated) DEVICE — REM POLYHESIVE ADULT PATIENT RETURN ELECTRODE: Brand: VALLEYLAB

## (undated) DEVICE — SUTURE STRATAFIX SPRL PDS + SZ 2-0 L6IN ABSRB VLT L36MM SXPP1B409

## (undated) DEVICE — NEEDLE HYPO 22GA L1.5IN BLK S STL HUB POLYPR SHLD REG BVL

## (undated) DEVICE — PAD SANIT NPKN 4IN GRD

## (undated) DEVICE — TRAY PREP DRY W/ PREM GLV 2 APPL 6 SPNG 2 UNDPD 1 OVERWRAP

## (undated) DEVICE — SURGICAL PROCEDURE PACK BASIN MAJ SET CUST NO CAUT

## (undated) DEVICE — KENDALL SCD EXPRESS SLEEVES, KNEE LENGTH, MEDIUM: Brand: KENDALL SCD

## (undated) DEVICE — ADPT HND SWCH HARM SCALP DISP --

## (undated) DEVICE — LEGGINGS: Brand: CONVERTORS

## (undated) DEVICE — DEVON™ KNEE AND BODY STRAP 60" X 3" (1.5 M X 7.6 CM): Brand: DEVON

## (undated) DEVICE — 3000CC GUARDIAN II: Brand: GUARDIAN

## (undated) DEVICE — STERILE POLYISOPRENE POWDER-FREE SURGICAL GLOVES: Brand: PROTEXIS

## (undated) DEVICE — EVAC SMOKE SEECLEAR XCL -- SEE CLEAR

## (undated) DEVICE — SHEARS ENDOSCP L36CM DIA5MM ULTRASONIC CRV TIP W/ ADV

## (undated) DEVICE — DERMABOND SKIN ADH 0.7ML -- DERMABOND ADVANCED 12/BX

## (undated) DEVICE — BARRIER TISS ADH ABSRB 3X4IN -- GYNECARE INTERCEED

## (undated) DEVICE — GOWN,SIRUS,NONRNF,SETINSLV,2XL,18/CS: Brand: MEDLINE

## (undated) DEVICE — COVER,MAYO STAND,STERILE: Brand: MEDLINE

## (undated) DEVICE — TRAY CATH OD16FR SIL URIN M STATLOK STBL DEV SURSTP

## (undated) DEVICE — SUTURE MCRYL SZ 4-0 L27IN ABSRB UD L19MM PS-2 1/2 CIR PRIM Y426H

## (undated) DEVICE — HANDLE LT SNAP ON ULT DURABLE LENS FOR TRUMPF ALC DISPOSABLE

## (undated) DEVICE — TROCAR: Brand: KII FIOS FIRST ENTRY

## (undated) DEVICE — Z INACTIVE USE 2527070 DRAPE SURG W40XL44IN UNDERBUTTOCK SMS POLYPR W/ PCH BK DISP

## (undated) DEVICE — INFECTION CONTROL KIT SYS

## (undated) DEVICE — SUTURE CHROMIC GUT SZ 2-0 L27IN ABSRB BRN L26MM SH 1/2 CIR G123H

## (undated) DEVICE — VISUALIZATION SYSTEM: Brand: CLEARIFY

## (undated) DEVICE — (D)PREP SKN CHLRAPRP APPL 26ML -- CONVERT TO ITEM 371833

## (undated) DEVICE — ELECTRO LUBE IS A SINGLE PATIENT USE DEVICE THAT IS INTENDED TO BE USED ON ELECTROSURGICAL ELECTRODES TO REDUCE STICKING.: Brand: KEY SURGICAL ELECTRO LUBE

## (undated) DEVICE — VCARE MEDIUM, UTERINE MANIPULATOR, VAGINAL-CERVICAL-AHLUWALIA'S-RETRACTOR-ELEVATOR: Brand: VCARE

## (undated) DEVICE — TOWEL SURG W17XL27IN STD BLU COT NONFENESTRATED PREWASHED

## (undated) DEVICE — SYR IRR BLB 2OZ DISP BLU STRL -- CONVERT TO ITEM 357637

## (undated) DEVICE — Device

## (undated) DEVICE — DRAPE,ROBOTICS,STERILE: Brand: MEDLINE

## (undated) DEVICE — BLADE HARM SCALP HK DISSECTING --

## (undated) DEVICE — TROCAR: Brand: KII SLEEVE

## (undated) DEVICE — SOLUTION IV 1000ML 0.9% SOD CHL

## (undated) DEVICE — TUBE KT ENDFLTN INSUFFLTN SVL --